# Patient Record
Sex: FEMALE | Race: WHITE | NOT HISPANIC OR LATINO
[De-identification: names, ages, dates, MRNs, and addresses within clinical notes are randomized per-mention and may not be internally consistent; named-entity substitution may affect disease eponyms.]

---

## 2017-01-25 ENCOUNTER — APPOINTMENT (OUTPATIENT)
Dept: COLORECTAL SURGERY | Facility: CLINIC | Age: 37
End: 2017-01-25

## 2017-01-25 VITALS
OXYGEN SATURATION: 98 % | BODY MASS INDEX: 21.34 KG/M2 | WEIGHT: 125 LBS | RESPIRATION RATE: 14 BRPM | HEIGHT: 64 IN | DIASTOLIC BLOOD PRESSURE: 76 MMHG | SYSTOLIC BLOOD PRESSURE: 106 MMHG | HEART RATE: 87 BPM

## 2017-01-25 RX ORDER — FLUCONAZOLE 150 MG/1
150 TABLET ORAL
Qty: 2 | Refills: 0 | Status: DISCONTINUED | COMMUNITY
Start: 2016-11-02

## 2017-01-25 RX ORDER — AMOXICILLIN AND CLAVULANATE POTASSIUM 875; 125 MG/1; MG/1
875-125 TABLET, COATED ORAL
Qty: 20 | Refills: 0 | Status: DISCONTINUED | COMMUNITY
Start: 2016-12-25

## 2017-01-25 RX ORDER — FLUTICASONE PROPIONATE 50 UG/1
50 SPRAY, METERED NASAL
Qty: 16 | Refills: 0 | Status: DISCONTINUED | COMMUNITY
Start: 2017-01-14

## 2017-04-23 ENCOUNTER — EMERGENCY (EMERGENCY)
Facility: HOSPITAL | Age: 37
LOS: 0 days | Discharge: HOME | End: 2017-04-23
Attending: EMERGENCY MEDICINE
Payer: COMMERCIAL

## 2017-04-23 VITALS
HEIGHT: 63 IN | RESPIRATION RATE: 17 BRPM | TEMPERATURE: 99 F | HEART RATE: 102 BPM | SYSTOLIC BLOOD PRESSURE: 105 MMHG | OXYGEN SATURATION: 96 % | WEIGHT: 125 LBS | DIASTOLIC BLOOD PRESSURE: 65 MMHG

## 2017-04-23 VITALS
OXYGEN SATURATION: 99 % | SYSTOLIC BLOOD PRESSURE: 106 MMHG | RESPIRATION RATE: 17 BRPM | HEART RATE: 86 BPM | DIASTOLIC BLOOD PRESSURE: 66 MMHG

## 2017-04-23 DIAGNOSIS — R05 COUGH: ICD-10-CM

## 2017-04-23 DIAGNOSIS — R19.7 DIARRHEA, UNSPECIFIED: ICD-10-CM

## 2017-04-23 DIAGNOSIS — K50.90 CROHN'S DISEASE, UNSPECIFIED, WITHOUT COMPLICATIONS: ICD-10-CM

## 2017-04-23 DIAGNOSIS — R11.2 NAUSEA WITH VOMITING, UNSPECIFIED: ICD-10-CM

## 2017-04-23 DIAGNOSIS — Z98.89 OTHER SPECIFIED POSTPROCEDURAL STATES: Chronic | ICD-10-CM

## 2017-04-23 DIAGNOSIS — G43.909 MIGRAINE, UNSPECIFIED, NOT INTRACTABLE, WITHOUT STATUS MIGRAINOSUS: ICD-10-CM

## 2017-04-23 DIAGNOSIS — E03.9 HYPOTHYROIDISM, UNSPECIFIED: ICD-10-CM

## 2017-04-23 LAB
ALBUMIN SERPL ELPH-MCNC: 3.6 G/DL — SIGNIFICANT CHANGE UP (ref 3.3–5)
ALP SERPL-CCNC: 82 U/L — SIGNIFICANT CHANGE UP (ref 40–120)
ALT FLD-CCNC: 176 U/L — HIGH (ref 12–78)
ANION GAP SERPL CALC-SCNC: 9 MMOL/L — SIGNIFICANT CHANGE UP (ref 5–17)
AST SERPL-CCNC: 76 U/L — HIGH (ref 15–37)
BASOPHILS # BLD AUTO: 0 K/UL — SIGNIFICANT CHANGE UP (ref 0–0.2)
BASOPHILS NFR BLD AUTO: 0.5 % — SIGNIFICANT CHANGE UP (ref 0–2)
BILIRUB SERPL-MCNC: 2 MG/DL — HIGH (ref 0.2–1.2)
BUN SERPL-MCNC: 12 MG/DL — SIGNIFICANT CHANGE UP (ref 7–23)
CALCIUM SERPL-MCNC: 8.2 MG/DL — LOW (ref 8.5–10.1)
CHLORIDE SERPL-SCNC: 103 MMOL/L — SIGNIFICANT CHANGE UP (ref 96–108)
CO2 SERPL-SCNC: 26 MMOL/L — SIGNIFICANT CHANGE UP (ref 22–31)
CREAT SERPL-MCNC: 0.73 MG/DL — SIGNIFICANT CHANGE UP (ref 0.5–1.3)
EOSINOPHIL # BLD AUTO: 0 K/UL — SIGNIFICANT CHANGE UP (ref 0–0.5)
EOSINOPHIL NFR BLD AUTO: 0 % — SIGNIFICANT CHANGE UP (ref 0–6)
GLUCOSE SERPL-MCNC: 85 MG/DL — SIGNIFICANT CHANGE UP (ref 70–99)
HCG SERPL-ACNC: <1 MIU/ML — SIGNIFICANT CHANGE UP
HCT VFR BLD CALC: 38.3 % — SIGNIFICANT CHANGE UP (ref 34.5–45)
HGB BLD-MCNC: 13.4 G/DL — SIGNIFICANT CHANGE UP (ref 11.5–15.5)
LACTATE SERPL-SCNC: 0.8 MMOL/L — SIGNIFICANT CHANGE UP (ref 0.7–2)
LIDOCAIN IGE QN: 177 U/L — SIGNIFICANT CHANGE UP (ref 73–393)
LYMPHOCYTES # BLD AUTO: 1.2 K/UL — SIGNIFICANT CHANGE UP (ref 1–3.3)
LYMPHOCYTES # BLD AUTO: 16.6 % — SIGNIFICANT CHANGE UP (ref 13–44)
MCHC RBC-ENTMCNC: 30.7 PG — SIGNIFICANT CHANGE UP (ref 27–34)
MCHC RBC-ENTMCNC: 34.8 GM/DL — SIGNIFICANT CHANGE UP (ref 32–36)
MCV RBC AUTO: 88.1 FL — SIGNIFICANT CHANGE UP (ref 80–100)
MONOCYTES # BLD AUTO: 0.5 K/UL — SIGNIFICANT CHANGE UP (ref 0–0.9)
MONOCYTES NFR BLD AUTO: 7.3 % — SIGNIFICANT CHANGE UP (ref 2–14)
NEUTROPHILS # BLD AUTO: 5.5 K/UL — SIGNIFICANT CHANGE UP (ref 1.8–7.4)
NEUTROPHILS NFR BLD AUTO: 75.5 % — SIGNIFICANT CHANGE UP (ref 43–77)
PLATELET # BLD AUTO: 181 K/UL — SIGNIFICANT CHANGE UP (ref 150–400)
POTASSIUM SERPL-MCNC: 3.6 MMOL/L — SIGNIFICANT CHANGE UP (ref 3.5–5.3)
POTASSIUM SERPL-SCNC: 3.6 MMOL/L — SIGNIFICANT CHANGE UP (ref 3.5–5.3)
PROT SERPL-MCNC: 7.3 GM/DL — SIGNIFICANT CHANGE UP (ref 6–8.3)
RBC # BLD: 4.35 M/UL — SIGNIFICANT CHANGE UP (ref 3.8–5.2)
RBC # FLD: 13.4 % — SIGNIFICANT CHANGE UP (ref 11–15)
SODIUM SERPL-SCNC: 138 MMOL/L — SIGNIFICANT CHANGE UP (ref 135–145)
WBC # BLD: 7.3 K/UL — SIGNIFICANT CHANGE UP (ref 3.8–10.5)
WBC # FLD AUTO: 7.3 K/UL — SIGNIFICANT CHANGE UP (ref 3.8–10.5)

## 2017-04-23 PROCEDURE — 99285 EMERGENCY DEPT VISIT HI MDM: CPT

## 2017-04-23 PROCEDURE — 71010: CPT | Mod: 26

## 2017-04-23 PROCEDURE — 74177 CT ABD & PELVIS W/CONTRAST: CPT | Mod: 26

## 2017-04-23 RX ORDER — KETOROLAC TROMETHAMINE 30 MG/ML
30 SYRINGE (ML) INJECTION ONCE
Qty: 0 | Refills: 0 | Status: DISCONTINUED | OUTPATIENT
Start: 2017-04-23 | End: 2017-04-23

## 2017-04-23 RX ORDER — SODIUM CHLORIDE 9 MG/ML
1000 INJECTION INTRAMUSCULAR; INTRAVENOUS; SUBCUTANEOUS ONCE
Qty: 0 | Refills: 0 | Status: COMPLETED | OUTPATIENT
Start: 2017-04-23 | End: 2017-04-23

## 2017-04-23 RX ORDER — ONDANSETRON 8 MG/1
8 TABLET, FILM COATED ORAL ONCE
Qty: 0 | Refills: 0 | Status: COMPLETED | OUTPATIENT
Start: 2017-04-23 | End: 2017-04-23

## 2017-04-23 RX ORDER — ONDANSETRON 8 MG/1
1 TABLET, FILM COATED ORAL
Qty: 6 | Refills: 0
Start: 2017-04-23 | End: 2017-04-26

## 2017-04-23 RX ORDER — TRAMADOL HYDROCHLORIDE 50 MG/1
1 TABLET ORAL
Qty: 12 | Refills: 0
Start: 2017-04-23 | End: 2017-04-26

## 2017-04-23 RX ADMIN — SODIUM CHLORIDE 1000 MILLILITER(S): 9 INJECTION INTRAMUSCULAR; INTRAVENOUS; SUBCUTANEOUS at 12:33

## 2017-04-23 RX ADMIN — Medication 30 MILLIGRAM(S): at 14:45

## 2017-04-23 RX ADMIN — ONDANSETRON 8 MILLIGRAM(S): 8 TABLET, FILM COATED ORAL at 12:44

## 2017-04-23 NOTE — ED PROVIDER NOTE - MEDICAL DECISION MAKING DETAILS
pt with nause/vomiting otherwise no ileitis on CT and noted no abd pain, cough without xr evidence of PNA, to dc with hycodan for cough and to dc with follow up with GI.

## 2017-04-23 NOTE — ED PROVIDER NOTE - OBJECTIVE STATEMENT
36 year old female with PMH of Crohns's disease, hypothyroid, Migraines hx presenting due to diarrhea noted x 1 day 2 days ago then some nausea/vomiting with cough noted as well. Fever at home Denies any SOB but states there is pain and discomfort on cough, no abd pain.

## 2017-04-23 NOTE — ED ADULT TRIAGE NOTE - CHIEF COMPLAINT QUOTE
" In just don't feel good, Friday I have diarrhea and yesterday I was throwing up and I have a really bad cough"

## 2017-04-23 NOTE — ED ADULT NURSE NOTE - OBJECTIVE STATEMENT
C/O diarrhea on Friday with vomiting yesterday, denies abdominal pain. Pt c/o coughing up brown sputum since Tuesday with fever at home yesterday

## 2018-02-13 ENCOUNTER — APPOINTMENT (OUTPATIENT)
Dept: OTOLARYNGOLOGY | Facility: CLINIC | Age: 38
End: 2018-02-13
Payer: COMMERCIAL

## 2018-02-13 VITALS
HEART RATE: 71 BPM | HEIGHT: 64 IN | WEIGHT: 124 LBS | BODY MASS INDEX: 21.17 KG/M2 | SYSTOLIC BLOOD PRESSURE: 97 MMHG | DIASTOLIC BLOOD PRESSURE: 55 MMHG

## 2018-02-13 DIAGNOSIS — H69.83 OTHER SPECIFIED DISORDERS OF EUSTACHIAN TUBE, BILATERAL: ICD-10-CM

## 2018-02-13 DIAGNOSIS — R09.81 NASAL CONGESTION: ICD-10-CM

## 2018-02-13 DIAGNOSIS — R09.89 OTHER SPECIFIED SYMPTOMS AND SIGNS INVOLVING THE CIRCULATORY AND RESPIRATORY SYSTEMS: ICD-10-CM

## 2018-02-13 DIAGNOSIS — J34.2 DEVIATED NASAL SEPTUM: ICD-10-CM

## 2018-02-13 DIAGNOSIS — J03.01 ACUTE RECURRENT STREPTOCOCCAL TONSILLITIS: ICD-10-CM

## 2018-02-13 PROCEDURE — 99204 OFFICE O/P NEW MOD 45 MIN: CPT | Mod: 25

## 2018-02-13 PROCEDURE — 31231 NASAL ENDOSCOPY DX: CPT

## 2019-07-10 ENCOUNTER — TRANSCRIPTION ENCOUNTER (OUTPATIENT)
Age: 39
End: 2019-07-10

## 2019-08-13 ENCOUNTER — APPOINTMENT (OUTPATIENT)
Dept: PULMONOLOGY | Facility: CLINIC | Age: 39
End: 2019-08-13
Payer: COMMERCIAL

## 2019-08-13 ENCOUNTER — APPOINTMENT (OUTPATIENT)
Dept: PULMONOLOGY | Facility: CLINIC | Age: 39
End: 2019-08-13

## 2019-08-13 VITALS
HEART RATE: 77 BPM | DIASTOLIC BLOOD PRESSURE: 73 MMHG | SYSTOLIC BLOOD PRESSURE: 108 MMHG | RESPIRATION RATE: 16 BRPM | OXYGEN SATURATION: 98 %

## 2019-08-13 DIAGNOSIS — F41.8 OTHER SPECIFIED ANXIETY DISORDERS: ICD-10-CM

## 2019-08-13 DIAGNOSIS — G47.00 INSOMNIA, UNSPECIFIED: ICD-10-CM

## 2019-08-13 PROCEDURE — 88738 HGB QUANT TRANSCUTANEOUS: CPT

## 2019-08-13 PROCEDURE — 99407 BEHAV CHNG SMOKING > 10 MIN: CPT | Mod: 25

## 2019-08-13 PROCEDURE — 94250: CPT

## 2019-08-13 PROCEDURE — 94060 EVALUATION OF WHEEZING: CPT

## 2019-08-13 PROCEDURE — 95012 NITRIC OXIDE EXP GAS DETER: CPT

## 2019-08-13 PROCEDURE — 94727 GAS DIL/WSHOT DETER LNG VOL: CPT

## 2019-08-13 PROCEDURE — 71046 X-RAY EXAM CHEST 2 VIEWS: CPT

## 2019-08-13 PROCEDURE — 94729 DIFFUSING CAPACITY: CPT

## 2019-08-13 PROCEDURE — 99205 OFFICE O/P NEW HI 60 MIN: CPT | Mod: 25

## 2019-08-13 NOTE — PHYSICAL EXAM
[Normal Appearance] : normal appearance [General Appearance - Well Developed] : well developed [General Appearance - Well Nourished] : well nourished [No Deformities] : no deformities [Well Groomed] : well groomed [General Appearance - In No Acute Distress] : no acute distress [Normal Oropharynx] : normal oropharynx [Heart Rate And Rhythm] : heart rate and rhythm were normal [Heart Sounds] : normal S1 and S2 [Murmurs] : no murmurs present [Exaggerated Use Of Accessory Muscles For Inspiration] : no accessory muscle use [Respiration, Rhythm And Depth] : normal respiratory rhythm and effort [Abdomen Soft] : soft [Auscultation Breath Sounds / Voice Sounds] : lungs were clear to auscultation bilaterally [Abdomen Tenderness] : non-tender [Abdomen Mass (___ Cm)] : no abdominal mass palpated [Nail Clubbing] : no clubbing of the fingernails [Petechial Hemorrhages (___cm)] : no petechial hemorrhages [] : no ischemic changes [Cyanosis, Localized] : no localized cyanosis [Sensation] : the sensory exam was normal to light touch and pinprick [Deep Tendon Reflexes (DTR)] : deep tendon reflexes were 2+ and symmetric [No Focal Deficits] : no focal deficits

## 2019-08-13 NOTE — ASSESSMENT
[FreeTextEntry1] : 10 minutes spent in smoking cessation counseling. Educated patient on deleterious effects and all potential consequences of smoking. Counseled patient on various smoking cessation techniques.\braden Ordered a home sleep study to definitively rule out obstructive sleep apnea, I advised her to return to the office for a followup visit after home sleep study has been performed.\braden Starting her on Celexa 10 mg p.o. q.d. for anxiety/depression, should home sleep study show no evidence of obstructive sleep apnea, would then recommend Ativan 1 mg p.o. t.i.d. p.r.n.

## 2019-08-13 NOTE — PROCEDURE
[FreeTextEntry1] : Chest x-ray PA and lateral views performed in my office today showed clear lungs, no evidence of infiltrates or pleural effusions.\par \par Pulmonary Function Test: Lung Volume: Within normal limits; Spirometry: Within normal limits with no improvement post bronchodilator; Diffusion: Within normal limits.\par \par Exhaled nitric oxide level is 7  PPB\par \par Carbon monoxide level is 1 PPM\par \par

## 2019-08-13 NOTE — REVIEW OF SYSTEMS
[Difficulty Initiating Sleep] : difficulty falling asleep [Difficulty Maintaining Sleep] : difficulty maintaining sleep [Negative] : Pulmonary Hypertension

## 2019-08-13 NOTE — REASON FOR VISIT
PAP order was faxed to [facility] on 1/2/18 [Initial Evaluation] : an initial evaluation [Hypersomnolence] : hypersomnolence  [FreeTextEntry2] : insomnia

## 2019-08-14 LAB — POCT - HEMOGLOBIN (HGB), QUANTITATIVE, TRANSCUTANEOUS: 12.3

## 2019-08-30 ENCOUNTER — APPOINTMENT (OUTPATIENT)
Dept: PULMONOLOGY | Facility: CLINIC | Age: 39
End: 2019-08-30
Payer: COMMERCIAL

## 2019-08-30 PROCEDURE — ZZZZZ: CPT

## 2019-09-10 ENCOUNTER — APPOINTMENT (OUTPATIENT)
Dept: PULMONOLOGY | Facility: CLINIC | Age: 39
End: 2019-09-10

## 2019-10-22 ENCOUNTER — APPOINTMENT (OUTPATIENT)
Dept: ENDOCRINOLOGY | Facility: CLINIC | Age: 39
End: 2019-10-22
Payer: COMMERCIAL

## 2019-10-22 VITALS
SYSTOLIC BLOOD PRESSURE: 90 MMHG | HEIGHT: 64 IN | HEART RATE: 90 BPM | OXYGEN SATURATION: 99 % | DIASTOLIC BLOOD PRESSURE: 60 MMHG | WEIGHT: 120 LBS | BODY MASS INDEX: 20.49 KG/M2

## 2019-10-22 PROCEDURE — 36415 COLL VENOUS BLD VENIPUNCTURE: CPT

## 2019-10-22 PROCEDURE — 99213 OFFICE O/P EST LOW 20 MIN: CPT | Mod: 25

## 2019-10-22 RX ORDER — CLINDAMYCIN HYDROCHLORIDE 300 MG/1
CAPSULE ORAL
Refills: 0 | Status: DISCONTINUED | COMMUNITY
End: 2019-10-22

## 2019-10-22 RX ORDER — OMEPRAZOLE 40 MG/1
40 CAPSULE, DELAYED RELEASE ORAL
Qty: 30 | Refills: 0 | Status: DISCONTINUED | COMMUNITY
Start: 2017-01-16 | End: 2019-10-22

## 2019-10-23 NOTE — ASSESSMENT
[FreeTextEntry1] : 40 yo F with PMH Hashimoto's thyroiditis, Crohn's disease, thyroid nodule\par \par 1. Hashimoto's thyroiditis - will check TFTs. \par \par 2. Thyroid nodule - refer for thyroid US

## 2019-10-23 NOTE — HISTORY OF PRESENT ILLNESS
[FreeTextEntry1] : 38 yo F with PMH Hashimoto's thyroiditis, crohn's disease, thyroid nodule\par \par Prior Endocrinologist: Dr Charles\par diagnosed with hypothyroidism 2013 in the setting of hair loss\par she is on synthroid brand 100 mcg qd. \par She denies FHx of thyroid disease. \par She denies history of radiation to the head or neck. \par She denies history of amiodarone or lithium use.\par No recent steroids \par reports history of thyroid nodule - reportedly benign FNA\par TSH 1.86 10/2/19\par \par

## 2019-10-29 LAB
25(OH)D3 SERPL-MCNC: 50.9 NG/ML
ALBUMIN SERPL ELPH-MCNC: 4.9 G/DL
ALP BLD-CCNC: 50 U/L
ALT SERPL-CCNC: 9 U/L
ANION GAP SERPL CALC-SCNC: 16 MMOL/L
AST SERPL-CCNC: 19 U/L
BASOPHILS # BLD AUTO: 0.03 K/UL
BASOPHILS NFR BLD AUTO: 0.5 %
BILIRUB SERPL-MCNC: 1 MG/DL
BUN SERPL-MCNC: 13 MG/DL
CALCIUM SERPL-MCNC: 9.9 MG/DL
CHLORIDE SERPL-SCNC: 101 MMOL/L
CO2 SERPL-SCNC: 24 MMOL/L
CREAT SERPL-MCNC: 0.73 MG/DL
EOSINOPHIL # BLD AUTO: 0.05 K/UL
EOSINOPHIL NFR BLD AUTO: 0.9 %
ESTIMATED AVERAGE GLUCOSE: 97 MG/DL
GLUCOSE SERPL-MCNC: 111 MG/DL
HBA1C MFR BLD HPLC: 5 %
HCT VFR BLD CALC: 39.4 %
HGB BLD-MCNC: 12.6 G/DL
IMM GRANULOCYTES NFR BLD AUTO: 0.3 %
LYMPHOCYTES # BLD AUTO: 1.85 K/UL
LYMPHOCYTES NFR BLD AUTO: 31.5 %
MAN DIFF?: NORMAL
MCHC RBC-ENTMCNC: 31.7 PG
MCHC RBC-ENTMCNC: 32 GM/DL
MCV RBC AUTO: 99 FL
MONOCYTES # BLD AUTO: 0.44 K/UL
MONOCYTES NFR BLD AUTO: 7.5 %
NEUTROPHILS # BLD AUTO: 3.48 K/UL
NEUTROPHILS NFR BLD AUTO: 59.3 %
PLATELET # BLD AUTO: 309 K/UL
POTASSIUM SERPL-SCNC: 3.7 MMOL/L
PROT SERPL-MCNC: 7.3 G/DL
RBC # BLD: 3.98 M/UL
RBC # FLD: 13.2 %
SODIUM SERPL-SCNC: 141 MMOL/L
T4 FREE SERPL-MCNC: 1.8 NG/DL
TSH SERPL-ACNC: 1.08 UIU/ML
WBC # FLD AUTO: 5.87 K/UL

## 2019-11-12 ENCOUNTER — RX RENEWAL (OUTPATIENT)
Age: 39
End: 2019-11-12

## 2019-11-27 ENCOUNTER — RX RENEWAL (OUTPATIENT)
Age: 39
End: 2019-11-27

## 2019-12-09 ENCOUNTER — INPATIENT (INPATIENT)
Facility: HOSPITAL | Age: 39
LOS: 2 days | Discharge: HOME | DRG: 386 | End: 2019-12-12
Attending: HOSPITALIST | Admitting: STUDENT IN AN ORGANIZED HEALTH CARE EDUCATION/TRAINING PROGRAM
Payer: COMMERCIAL

## 2019-12-09 ENCOUNTER — APPOINTMENT (OUTPATIENT)
Dept: OBGYN | Facility: CLINIC | Age: 39
End: 2019-12-09
Payer: COMMERCIAL

## 2019-12-09 VITALS
HEIGHT: 64 IN | HEART RATE: 86 BPM | SYSTOLIC BLOOD PRESSURE: 128 MMHG | DIASTOLIC BLOOD PRESSURE: 80 MMHG | OXYGEN SATURATION: 100 % | WEIGHT: 119.93 LBS | RESPIRATION RATE: 18 BRPM | TEMPERATURE: 98 F

## 2019-12-09 VITALS
WEIGHT: 120 LBS | BODY MASS INDEX: 20.49 KG/M2 | DIASTOLIC BLOOD PRESSURE: 60 MMHG | SYSTOLIC BLOOD PRESSURE: 99 MMHG | HEIGHT: 64 IN

## 2019-12-09 DIAGNOSIS — Z98.89 OTHER SPECIFIED POSTPROCEDURAL STATES: Chronic | ICD-10-CM

## 2019-12-09 PROCEDURE — 99203 OFFICE O/P NEW LOW 30 MIN: CPT

## 2019-12-09 PROCEDURE — 99284 EMERGENCY DEPT VISIT MOD MDM: CPT

## 2019-12-10 ENCOUNTER — APPOINTMENT (OUTPATIENT)
Dept: OTOLARYNGOLOGY | Facility: CLINIC | Age: 39
End: 2019-12-10

## 2019-12-10 DIAGNOSIS — Z02.9 ENCOUNTER FOR ADMINISTRATIVE EXAMINATIONS, UNSPECIFIED: ICD-10-CM

## 2019-12-10 DIAGNOSIS — E03.8 OTHER SPECIFIED HYPOTHYROIDISM: ICD-10-CM

## 2019-12-10 DIAGNOSIS — K50.812 CROHN'S DISEASE OF BOTH SMALL AND LARGE INTESTINE WITH INTESTINAL OBSTRUCTION: ICD-10-CM

## 2019-12-10 DIAGNOSIS — K56.609 UNSPECIFIED INTESTINAL OBSTRUCTION, UNSPECIFIED AS TO PARTIAL VERSUS COMPLETE OBSTRUCTION: ICD-10-CM

## 2019-12-10 DIAGNOSIS — Z79.899 OTHER LONG TERM (CURRENT) DRUG THERAPY: ICD-10-CM

## 2019-12-10 DIAGNOSIS — F17.200 NICOTINE DEPENDENCE, UNSPECIFIED, UNCOMPLICATED: ICD-10-CM

## 2019-12-10 DIAGNOSIS — Z29.9 ENCOUNTER FOR PROPHYLACTIC MEASURES, UNSPECIFIED: ICD-10-CM

## 2019-12-10 LAB
ALBUMIN SERPL ELPH-MCNC: 4.3 G/DL — SIGNIFICANT CHANGE UP (ref 3.3–5)
ALP SERPL-CCNC: 60 U/L — SIGNIFICANT CHANGE UP (ref 40–120)
ALT FLD-CCNC: 17 U/L — SIGNIFICANT CHANGE UP (ref 10–45)
ANION GAP SERPL CALC-SCNC: 15 MMOL/L — SIGNIFICANT CHANGE UP (ref 5–17)
AST SERPL-CCNC: 19 U/L — SIGNIFICANT CHANGE UP (ref 10–40)
BASOPHILS # BLD AUTO: 0.02 K/UL — SIGNIFICANT CHANGE UP (ref 0–0.2)
BASOPHILS NFR BLD AUTO: 0.2 % — SIGNIFICANT CHANGE UP (ref 0–2)
BILIRUB SERPL-MCNC: 0.6 MG/DL — SIGNIFICANT CHANGE UP (ref 0.2–1.2)
BUN SERPL-MCNC: 11 MG/DL — SIGNIFICANT CHANGE UP (ref 7–23)
CALCIUM SERPL-MCNC: 8.8 MG/DL — SIGNIFICANT CHANGE UP (ref 8.4–10.5)
CHLORIDE SERPL-SCNC: 101 MMOL/L — SIGNIFICANT CHANGE UP (ref 96–108)
CO2 SERPL-SCNC: 23 MMOL/L — SIGNIFICANT CHANGE UP (ref 22–31)
CREAT SERPL-MCNC: 0.76 MG/DL — SIGNIFICANT CHANGE UP (ref 0.5–1.3)
EOSINOPHIL # BLD AUTO: 0.05 K/UL — SIGNIFICANT CHANGE UP (ref 0–0.5)
EOSINOPHIL NFR BLD AUTO: 0.4 % — SIGNIFICANT CHANGE UP (ref 0–6)
GLUCOSE BLDC GLUCOMTR-MCNC: 133 MG/DL — HIGH (ref 70–99)
GLUCOSE SERPL-MCNC: 110 MG/DL — HIGH (ref 70–99)
HCG SERPL-ACNC: <2 MIU/ML — SIGNIFICANT CHANGE UP
HCT VFR BLD CALC: 37.9 % — SIGNIFICANT CHANGE UP (ref 34.5–45)
HGB BLD-MCNC: 13 G/DL — SIGNIFICANT CHANGE UP (ref 11.5–15.5)
IMM GRANULOCYTES NFR BLD AUTO: 0.4 % — SIGNIFICANT CHANGE UP (ref 0–1.5)
LIDOCAIN IGE QN: 51 U/L — SIGNIFICANT CHANGE UP (ref 7–60)
LYMPHOCYTES # BLD AUTO: 1.62 K/UL — SIGNIFICANT CHANGE UP (ref 1–3.3)
LYMPHOCYTES # BLD AUTO: 12.9 % — LOW (ref 13–44)
MCHC RBC-ENTMCNC: 32 PG — SIGNIFICANT CHANGE UP (ref 27–34)
MCHC RBC-ENTMCNC: 34.3 GM/DL — SIGNIFICANT CHANGE UP (ref 32–36)
MCV RBC AUTO: 93.3 FL — SIGNIFICANT CHANGE UP (ref 80–100)
MONOCYTES # BLD AUTO: 0.77 K/UL — SIGNIFICANT CHANGE UP (ref 0–0.9)
MONOCYTES NFR BLD AUTO: 6.1 % — SIGNIFICANT CHANGE UP (ref 2–14)
NEUTROPHILS # BLD AUTO: 10.06 K/UL — HIGH (ref 1.8–7.4)
NEUTROPHILS NFR BLD AUTO: 80 % — HIGH (ref 43–77)
NRBC # BLD: 0 /100 WBCS — SIGNIFICANT CHANGE UP (ref 0–0)
PLATELET # BLD AUTO: 328 K/UL — SIGNIFICANT CHANGE UP (ref 150–400)
POTASSIUM SERPL-MCNC: 3.8 MMOL/L — SIGNIFICANT CHANGE UP (ref 3.5–5.3)
POTASSIUM SERPL-SCNC: 3.8 MMOL/L — SIGNIFICANT CHANGE UP (ref 3.5–5.3)
PROT SERPL-MCNC: 7.6 G/DL — SIGNIFICANT CHANGE UP (ref 6–8.3)
RAPID RVP RESULT: SIGNIFICANT CHANGE UP
RBC # BLD: 4.06 M/UL — SIGNIFICANT CHANGE UP (ref 3.8–5.2)
RBC # FLD: 13.3 % — SIGNIFICANT CHANGE UP (ref 10.3–14.5)
SODIUM SERPL-SCNC: 139 MMOL/L — SIGNIFICANT CHANGE UP (ref 135–145)
WBC # BLD: 12.57 K/UL — HIGH (ref 3.8–10.5)
WBC # FLD AUTO: 12.57 K/UL — HIGH (ref 3.8–10.5)

## 2019-12-10 PROCEDURE — 74177 CT ABD & PELVIS W/CONTRAST: CPT | Mod: 26

## 2019-12-10 PROCEDURE — 99223 1ST HOSP IP/OBS HIGH 75: CPT | Mod: AI

## 2019-12-10 PROCEDURE — 12345: CPT | Mod: NC

## 2019-12-10 PROCEDURE — 99222 1ST HOSP IP/OBS MODERATE 55: CPT

## 2019-12-10 PROCEDURE — 71045 X-RAY EXAM CHEST 1 VIEW: CPT | Mod: 26

## 2019-12-10 RX ORDER — LEVOTHYROXINE SODIUM 125 MCG
75 TABLET ORAL AT BEDTIME
Refills: 0 | Status: DISCONTINUED | OUTPATIENT
Start: 2019-12-10 | End: 2019-12-12

## 2019-12-10 RX ORDER — ENOXAPARIN SODIUM 100 MG/ML
40 INJECTION SUBCUTANEOUS DAILY
Refills: 0 | Status: DISCONTINUED | OUTPATIENT
Start: 2019-12-10 | End: 2019-12-12

## 2019-12-10 RX ORDER — MORPHINE SULFATE 50 MG/1
2 CAPSULE, EXTENDED RELEASE ORAL ONCE
Refills: 0 | Status: DISCONTINUED | OUTPATIENT
Start: 2019-12-10 | End: 2019-12-10

## 2019-12-10 RX ORDER — SODIUM CHLORIDE 9 MG/ML
1000 INJECTION, SOLUTION INTRAVENOUS ONCE
Refills: 0 | Status: COMPLETED | OUTPATIENT
Start: 2019-12-10 | End: 2019-12-10

## 2019-12-10 RX ORDER — LEVOTHYROXINE SODIUM 125 MCG
1 TABLET ORAL
Qty: 0 | Refills: 0 | DISCHARGE

## 2019-12-10 RX ORDER — MORPHINE SULFATE 50 MG/1
2 CAPSULE, EXTENDED RELEASE ORAL ONCE
Refills: 0 | Status: DISCONTINUED | OUTPATIENT
Start: 2019-12-10 | End: 2019-12-12

## 2019-12-10 RX ORDER — MERCAPTOPURINE 50 MG/1
0 TABLET ORAL
Qty: 0 | Refills: 0 | DISCHARGE

## 2019-12-10 RX ORDER — SODIUM CHLORIDE 9 MG/ML
1000 INJECTION, SOLUTION INTRAVENOUS
Refills: 0 | Status: DISCONTINUED | OUTPATIENT
Start: 2019-12-10 | End: 2019-12-12

## 2019-12-10 RX ORDER — MERCAPTOPURINE 50 MG/1
1 TABLET ORAL
Qty: 0 | Refills: 0 | DISCHARGE

## 2019-12-10 RX ORDER — BENZOCAINE AND MENTHOL 5; 1 G/100ML; G/100ML
1 LIQUID ORAL EVERY 6 HOURS
Refills: 0 | Status: DISCONTINUED | OUTPATIENT
Start: 2019-12-10 | End: 2019-12-12

## 2019-12-10 RX ADMIN — Medication 75 MICROGRAM(S): at 23:09

## 2019-12-10 RX ADMIN — SODIUM CHLORIDE 1000 MILLILITER(S): 9 INJECTION, SOLUTION INTRAVENOUS at 01:17

## 2019-12-10 RX ADMIN — Medication 20 MILLIGRAM(S): at 23:09

## 2019-12-10 RX ADMIN — MORPHINE SULFATE 2 MILLIGRAM(S): 50 CAPSULE, EXTENDED RELEASE ORAL at 05:44

## 2019-12-10 RX ADMIN — Medication 125 MILLIGRAM(S): at 05:44

## 2019-12-10 RX ADMIN — BENZOCAINE AND MENTHOL 1 LOZENGE: 5; 1 LIQUID ORAL at 14:18

## 2019-12-10 RX ADMIN — SODIUM CHLORIDE 100 MILLILITER(S): 9 INJECTION, SOLUTION INTRAVENOUS at 13:29

## 2019-12-10 RX ADMIN — Medication 20 MILLIGRAM(S): at 13:30

## 2019-12-10 NOTE — H&P ADULT - ADDITIONAL PE
T(F): 98.6 (10 Dec 2019 06:10), Max: 98.6 (10 Dec 2019 06:10)  HR: 72 - 86  BP: 98/68 - 128/80  RR: 16 - 18  SpO2: 99% - 100%

## 2019-12-10 NOTE — ED ADULT NURSE REASSESSMENT NOTE - NS ED NURSE REASSESS COMMENT FT1
Pt remains stable in the ED. Resting comfortably in bed with family at the bedside. A&Ox3. Breathing spontaneously and unlabored. NAD. Surgery came to see the pt. Stated that they don't want to do surgery right now. Will continue to monitor. Awaiting RVP Panel

## 2019-12-10 NOTE — H&P ADULT - PROBLEM SELECTOR PLAN 2
failed Remicade, currently on Humira being admitted with SBO due to Crohn's  - s/p Solumedrol 125 x1 in ED this am  - GI consulted for further management options

## 2019-12-10 NOTE — ED PROVIDER NOTE - ATTENDING CONTRIBUTION TO CARE
pt w hx crohns here w lower abd pain, n/v, poor appetite. on exam, pt with diffuse right sided lower>upper abd tenderness. CT shows terminal ileitis with stricture causing SBO. pt got IVFs, pain control, surgery consult - awaiting dispo by surgery team, expect admission for further care of SBO and ileitis.

## 2019-12-10 NOTE — H&P ADULT - PROBLEM SELECTOR PLAN 6
Transitions of Care Status:  1.  Name of PCP: Lani Ware (JULIO), but planning to change due to insurance  2.  PCP Contacted on Admission: [x ] Y    [ ] N    3.  PCP contacted at Discharge: [ ] Y    [ ] N    [ ] N/A  4.  Post-Discharge Appointment Date and Location:  5.  Summary of Handoff given to PCP:

## 2019-12-10 NOTE — ED ADULT NURSE REASSESSMENT NOTE - NS ED NURSE REASSESS COMMENT FT1
PT CT results show SBO, NG tube placed by surgery 200 mL dark gastric contents suctioned into cannister. Pt tolerated NG placement well. NAD.

## 2019-12-10 NOTE — CONSULT NOTE ADULT - SUBJECTIVE AND OBJECTIVE BOX
Chief Complaint:  Patient is a 39y old  Female who presents with a chief complaint of abd pain (10 Dec 2019 07:38)      HPI: Pt is a 40yo F PMH Crohn's diagnosed in , now Humira [id number], 6MP with previous bowel obstructions upon diagnosis, hypothyroidism who presents with 3 days of lwoer abdominal pain. Pt noted symptoms happened in 10/27 and then self resolved. Patient reports she started having recurrent pain in her lower abdomen on Saturday night, associated with one episode of emesis. She thought it gynecologic pain. Pelvic sono a month ago was reported to be normal. The pain continued to travel upwards, intermittent 8-10/10 sharp, associated with abd bloating, decrease PO intake, not significantly improved with Ibuprofen. Pt had no BM for the last 2 days and not eating solids for 3 days and not passing gas- but states she never passes gas as she is on low reisdue diet. Recently completed 10 days course of Clinda for Strep throat and Otitis (Rx by ENT).  Denies recent travel or sick contact.    Per pt, dx in , was on pentasa and then switched to a [facility] provider. She was placed on remicade for 1 year and did well. Then switched to humira as she didnt have time for infusions. She was doing well for 2 years on humira but not with symptoms on this admission.    Allergies:  No Known Allergies      Home Medications:    Hospital Medications:  enoxaparin Injectable 40 milliGRAM(s) SubCutaneous daily  lactated ringers. 1000 milliLiter(s) IV Continuous <Continuous>  levothyroxine Injectable 75 MICROGram(s) IV Push at bedtime  morphine  - Injectable 2 milliGRAM(s) IV Push once PRN      PMHX/PSHX:  Migraines  Crohn disease  Hypothyroid  No pertinent past medical history  H/O  section  Pregnancy with history of caesarean section, antepartum  No significant past surgical history      Family history:  Family history of heart disease  Family history of stomach cancer  No pertinent family history in first degree relatives      Social History:     ROS:     General:  No wt loss, fevers, chills, night sweats, fatigue,   Eyes:  Good vision, no reported pain  ENT:  No sore throat, pain, runny nose, dysphagia  CV:  No pain, palpitations, hypo/hypertension  Resp:  No dyspnea, cough, tachypnea, wheezing  GI:  See HPI  :  No pain, bleeding, incontinence, nocturia  Muscle:  No pain, weakness  Neuro:  No weakness, tingling, memory problems  Psych:  No fatigue, insomnia, mood problems, depression  Endocrine:  No polyuria, polydipsia, cold/heat intolerance  Heme:  No petechiae, ecchymosis, easy bruisability  Skin:  No rash, edema      PHYSICAL EXAM:     Vital Signs:  Vital Signs Last 24 Hrs  T(C): 36.7 (10 Dec 2019 07:15), Max: 37 (10 Dec 2019 06:10)  T(F): 98.1 (10 Dec 2019 07:15), Max: 98.6 (10 Dec 2019 06:10)  HR: 69 (10 Dec 2019 07:15) (69 - 86)  BP: 127/83 (10 Dec 2019 07:15) (98/68 - 128/80)  BP(mean): --  RR: 18 (10 Dec 2019 07:15) (16 - 18)  SpO2: 100% (10 Dec 2019 07:15) (99% - 100%)  Daily Height in cm: 162.56 (09 Dec 2019 22:30)    Daily     GENERAL:  Appears stated age, well-groomed, well-nourished, no distress  HEENT:  NC/AT,  conjunctivae clear and pink  CHEST:  Full & symmetric excursion, no increased effort, breath sounds clear  HEART:  Regular rhythm, S1, S2, no murmur/rub/S3/S4, no abdominal bruit, no edema, no JVD  ABDOMEN:  Soft, non-tender, non-distended, normoactive bowel sounds,  no masses , no hepatosplenomegaly  EXTREMITIES:  no cyanosis, clubbing or edema  SKIN:  No rash/erythema/ecchymoses/petechiae/wounds/abscess/warm/dry  NEURO:  Alert, oriented    LABS:                        13.0   12.57 )-----------( 328      ( 10 Dec 2019 01:28 )             37.9     12-10    139  |  101  |  11  ----------------------------<  110<H>  3.8   |  23  |  0.76    Ca    8.8      10 Dec 2019 01:28    TPro  7.6  /  Alb  4.3  /  TBili  0.6  /  DBili  x   /  AST  19  /  ALT  17  /  AlkPhos  60  12-10    LIVER FUNCTIONS - ( 10 Dec 2019 01:28 )  Alb: 4.3 g/dL / Pro: 7.6 g/dL / ALK PHOS: 60 U/L / ALT: 17 U/L / AST: 19 U/L / GGT: x               Amylase Serum--      Lipase serum51       Ammonia--      Imaging:  < from: CT Abdomen and Pelvis w/Cont (06.03.15 @ 13:13) >  Findings: Visualized lung bases are unremarkable.    Liver and spleen are normal in size without focal lesion. There is no   biliary ductal dilatation. The common bile duct is normal in caliber. The   gallbladder is identified. The pancreas is without enlargement or mass.    The adrenal glands are normal. The kidneys enhance bilaterally. There is   no hydronephrosis. There is mild fullness of the upper pole calyces of   the right kidney.    There is no retroperitoneal lymphadenopathy. The visualized vascular   structures are unremarkable.    There is small bowel obstruction secondary to thickening of the distal   terminal ileum and ileocecal valve region. Narrowing is noted with   surrounding soft tissue density. A few nonspecific lymph nodes are seen   within the right lower quadrant. The appendix is not visualized.    The urinary bladder appears normal. The uterus and adnexal regions are   unremarkable.     Examination of the visualized bony structures reveals no abnormality.    IMPRESSION: Small bowel obstruction secondary to concentric thickening of   the distal terminal ileum and ileocecal valve. This could represent   Crohn's disease in this young patient. Alternatively, neoplasm cannot be   excluded. Definitive further evaluationshould be performed with   colonoscopy or CT colonoscopy.    Findings were discussed with Dr. Kellogg on 6/3/2015 at 2:05 PM by Dr. Alivia Terrell  with read back.    < end of copied text > Chief Complaint:  Patient is a 39y old  Female who presents with a chief complaint of abd pain (10 Dec 2019 07:38)      HPI: Pt is a 40yo F PMH Crohn's diagnosed in , now Humira [id number], 6MP with previous bowel obstructions upon diagnosis, hypothyroidism who presents with 3 days of lwoer abdominal pain. Pt noted symptoms happened in 10/27 and then self resolved. Patient reports she started having recurrent pain in her lower abdomen on Saturday night, associated with one episode of emesis. She thought it gynecologic pain. Pelvic sono a month ago was reported to be normal. The pain continued to travel upwards, intermittent 8-10/10 sharp, associated with abd bloating, decrease PO intake, not significantly improved with Ibuprofen. Pt had no BM for the last 2 days and not eating solids for 3 days and not passing gas- but states she never passes gas as she is on low reisdue diet. Recently completed 10 days course of Clinda for Strep throat and Otitis (Rx by ENT).  Denies recent travel or sick contact.    Per pt, dx in , was on pentasa and then switched to a different provider. She was placed on remicade for 1 year and did well. Then switched to humira as she didnt have time for infusions. She was doing well for 2 years on Humira but not with symptoms on this admission. She has also been on 6MP. Recently in May had normal levels for Humira without antibodies. She has skipped humira doses because of having recent strep throat.    Allergies:  No Known Allergies      Home Medications:    Hospital Medications:  enoxaparin Injectable 40 milliGRAM(s) SubCutaneous daily  lactated ringers. 1000 milliLiter(s) IV Continuous <Continuous>  levothyroxine Injectable 75 MICROGram(s) IV Push at bedtime  morphine  - Injectable 2 milliGRAM(s) IV Push once PRN      PMHX/PSHX:  Migraines  Crohn disease  Hypothyroid  No pertinent past medical history  H/O  section  Pregnancy with history of caesarean section, antepartum  No significant past surgical history      Family history:  Family history of heart disease  Family history of stomach cancer  No pertinent family history in first degree relatives      Social History:     ROS:     General:  No wt loss, fevers, chills, night sweats, fatigue,   Eyes:  Good vision, no reported pain  ENT:  No sore throat, pain, runny nose, dysphagia  CV:  No pain, palpitations, hypo/hypertension  Resp:  No dyspnea, cough, tachypnea, wheezing  GI:  See HPI  :  No pain, bleeding, incontinence, nocturia  Muscle:  No pain, weakness  Neuro:  No weakness, tingling, memory problems  Psych:  No fatigue, insomnia, mood problems, depression  Endocrine:  No polyuria, polydipsia, cold/heat intolerance  Heme:  No petechiae, ecchymosis, easy bruisability  Skin:  No rash, edema      PHYSICAL EXAM:     Vital Signs:  Vital Signs Last 24 Hrs  T(C): 36.7 (10 Dec 2019 07:15), Max: 37 (10 Dec 2019 06:10)  T(F): 98.1 (10 Dec 2019 07:15), Max: 98.6 (10 Dec 2019 06:10)  HR: 69 (10 Dec 2019 07:15) (69 - 86)  BP: 127/83 (10 Dec 2019 07:15) (98/68 - 128/80)  BP(mean): --  RR: 18 (10 Dec 2019 07:15) (16 - 18)  SpO2: 100% (10 Dec 2019 07:15) (99% - 100%)  Daily Height in cm: 162.56 (09 Dec 2019 22:30)    Daily     GENERAL:  Appears stated age, well-groomed, well-nourished, no distress  HEENT:  NC/AT,  conjunctivae clear and pink  CHEST:  Full & symmetric excursion, no increased effort, breath sounds clear  HEART:  Regular rhythm, S1, S2, no murmur/rub/S3/S4, no abdominal bruit, no edema, no JVD  ABDOMEN:  Soft, non-tender, non-distended, normoactive bowel sounds,  no masses , no hepatosplenomegaly  EXTREMITIES:  no cyanosis, clubbing or edema  SKIN:  No rash/erythema/ecchymoses/petechiae/wounds/abscess/warm/dry  NEURO:  Alert, oriented    LABS:                        13.0   12.57 )-----------( 328      ( 10 Dec 2019 01:28 )             37.9     12-10    139  |  101  |  11  ----------------------------<  110<H>  3.8   |  23  |  0.76    Ca    8.8      10 Dec 2019 01:28    TPro  7.6  /  Alb  4.3  /  TBili  0.6  /  DBili  x   /  AST  19  /  ALT  17  /  AlkPhos  60  12-10    LIVER FUNCTIONS - ( 10 Dec 2019 01:28 )  Alb: 4.3 g/dL / Pro: 7.6 g/dL / ALK PHOS: 60 U/L / ALT: 17 U/L / AST: 19 U/L / GGT: x               Amylase Serum--      Lipase serum51       Ammonia--      Imaging:  < from: CT Abdomen and Pelvis w/Cont (06.03.15 @ 13:13) >  Findings: Visualized lung bases are unremarkable.    Liver and spleen are normal in size without focal lesion. There is no   biliary ductal dilatation. The common bile duct is normal in caliber. The   gallbladder is identified. The pancreas is without enlargement or mass.    The adrenal glands are normal. The kidneys enhance bilaterally. There is   no hydronephrosis. There is mild fullness of the upper pole calyces of   the right kidney.    There is no retroperitoneal lymphadenopathy. The visualized vascular   structures are unremarkable.    There is small bowel obstruction secondary to thickening of the distal   terminal ileum and ileocecal valve region. Narrowing is noted with   surrounding soft tissue density. A few nonspecific lymph nodes are seen   within the right lower quadrant. The appendix is not visualized.    The urinary bladder appears normal. The uterus and adnexal regions are   unremarkable.     Examination of the visualized bony structures reveals no abnormality.    IMPRESSION: Small bowel obstruction secondary to concentric thickening of   the distal terminal ileum and ileocecal valve. This could represent   Crohn's disease in this young patient. Alternatively, neoplasm cannot be   excluded. Definitive further evaluationshould be performed with   colonoscopy or CT colonoscopy.    Findings were discussed with Dr. Kellogg on 6/3/2015 at 2:05 PM by Dr. Alivia Terrell  with read back.    < end of copied text >

## 2019-12-10 NOTE — H&P ADULT - NSHPSOCIALHISTORY_GEN_ALL_CORE
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

## 2019-12-10 NOTE — ED PROVIDER NOTE - PHYSICAL EXAMINATION
CONSTITUTIONAL: NAD, awake, alert  HEAD: Normocephalic; atraumatic  ENMT: External appears normal, MMM  CARD: Normal Sl, S2; no audible murmurs  RESP: normal wob, lungs ctab  ABD: soft, non-distended; + RLQ tenderness   MSK: no edema, normal ROM in all four extremities  SKIN: Warm, dry, no rashes  NEURO: aaox3, moving all extremities spontaneously

## 2019-12-10 NOTE — CONSULT NOTE ADULT - ASSESSMENT
Incomplete*** Impression:  # Crohn's Flare with SBO: CT scan showing concentric thickening at distal terminal ileum and IC valve. Humira levels normal and may w/o ab formation. On Humira [id number] and 6MP. Skipped recent humira as she has step throat. Was doing well on Humira for 2 years. Prior on Remicade with success but switched as she didn't want to go on infusions. Currently hemodynamically stable.     Recommendation:  - monitor CBC, CMP  - continue with NGT with intermittent suction  - solumedrol 20mg TID  - check Humira antibody levels now  - will need to get Humira from home for inpatient dose  - monitor serial abdominal exams  - surgery reccs appreciate; medical management for now  - supportive care    Odell Mc  [phone]  Please call/page on call fellow on weekends and weekdays after 5pm

## 2019-12-10 NOTE — CONSULT NOTE ADULT - SUBJECTIVE AND OBJECTIVE BOX
General Surgery Consult  Consulting surgical team: Colorectal Surgery  Consulting attending: Johnson Sofia    HPI: 39F hx Crohn's (on Humira, 6MP, allopurinol), previous bowel obstructions, hypothyroidism who presents with 3 days of abdominal pain. Patient reports she started having pain in her lower abdomen on Saturday night, associated with one episode of emesis. The pain continued to travel upwards and she was not having much of an appetite. She went to her gynecologist today for evaluation, who performed an exam and concluded the pain was unrelated. She also had a pelvic ultrasound 1 month ago which was negative for abnormalities. The pain did not improve so she presented to the ED for further evaluation. Denies fevers, chills, dysuria. Last BM was on . Patient also reports she has a strep throat infection for which she is taking clindamycin.     Used to follow with Dr. Kelly (GI) before switching insurances and receiving care at [facility] for past few years. Now would like to re-establish care at [facility] due to insurance change. Also saw Dr. Sofia in the past who recommended surgical intervention at that time.    Patient reports she smokes cigarettes when she drinks, about 15 drinks per week (1 on the week days and 5-6 when she goes out on the weekends). Occasional marijuana use.      PAST MEDICAL HISTORY:  Migraines  Crohn disease  Hypothyroid  No pertinent past medical history      PAST SURGICAL HISTORY:  H/O  section  Pregnancy with history of caesarean section, antepartum  No significant past surgical history      MEDICATIONS:  morphine  - Injectable 2 milliGRAM(s) IV Push Once      ALLERGIES:  No Known Allergies      VITALS & I/Os:  Vital Signs Last 24 Hrs  T(C): 36.7 (10 Dec 2019 01:59), Max: 36.9 (09 Dec 2019 22:30)  T(F): 98 (10 Dec 2019 01:59), Max: 98.5 (09 Dec 2019 22:30)  HR: 72 (10 Dec 2019 01:59) (72 - 86)  BP: 98/68 (10 Dec 2019 01:59) (98/68 - 128/80)  BP(mean): --  RR: 16 (10 Dec 2019 01:59) (16 - 18)  SpO2: 99% (10 Dec 2019 01:59) (99% - 100%)    I&O's Summary      PHYSICAL EXAM:  General: No acute distress  Respiratory: Nonlabored  Cardiovascular: RRR  Abdominal: Soft, mildly distended, diffusely tender worse in epigastrium associated with involuntary guarding, no rebound.   Extremities: Warm    LABS:                        13.0   12.57 )-----------( 328      ( 10 Dec 2019 01:28 )             37.9     12-10    139  |  101  |  11  ----------------------------<  110<H>  3.8   |  23  |  0.76    Ca    8.8      10 Dec 2019 01:28    TPro  7.6  /  Alb  4.3  /  TBili  0.6  /  DBili  x   /  AST  19  /  ALT  17  /  AlkPhos  60  12-10    Lactate:        IMAGING:  < from: CT Abdomen and Pelvis w/ IV Cont (12.10.19 @ 02:52) >    EXAM:  CT ABDOMEN AND PELVIS IC                            PROCEDURE DATE:  12/10/2019            INTERPRETATION:  CLINICAL INFORMATION: Right lower pain right lower   quadrant abdominal pain, evaluate for appendicitis , Crohn's disease    COMPARISON: CT abdomen pelvis 2017    PROCEDURE:   CT of the Abdomen and Pelvis was performed with intravenous contrast.   Intravenous contrast: 90 ml Omnipaque 350. 10 ml discarded.  Oral contrast: None.  Sagittal and coronal reformats were performed.    FINDINGS:    LOWER CHEST: Within normal limits.    LIVER: Within normal limits.  BILE DUCTS: Normal caliber.  GALLBLADDER: Cholelithiasis.  SPLEEN: Within normal limits.  PANCREAS: Within normal limits.  ADRENALS: Within normal limits.  KIDNEYS/URETERS: Within normal limits.    BLADDER: Within normal limits.  REPRODUCTIVE ORGANS: Approximately 3.6 cm subserosal fibroid with   myometrial component is located in the anterior uterus on the left side   (5:24)    BOWEL: There is terminal ileitis withan active inflammatory stricture   located just proximal to the ileocecal valve, resulting in a small bowel   obstruction.  Normal appendix.  Intermittent underdistention of the   sigmoid colon.    PERITONEUM: Small to moderate amount of ascites in the lower abdomen and   pelvis.  VESSELS: Within normal limits.  RETROPERITONEUM/LYMPH NODES: No lymphadenopathy.    ABDOMINAL WALL: Within normal limits.  BONES: Within normal limits.    IMPRESSION:     Terminal ileitis with active inflammatory stricture located just proximal   to the ileocecal involve, resulting in a small bowel obstruction.  No   enteric fistula, sinus tract or abscess is visualized.    No evidence for acute appendicitis.      ELIZABETH KIRKPATRICK M.D., RADIOLOGY RESIDENT  This document has been electronically signed.  THADDEUS BAILEY M.D.,ATTENDING RADIOLOGIST  This document has been electronically signed. Dec 10 2019  4:10AM    < end of copied text >

## 2019-12-10 NOTE — ED PROVIDER NOTE - OBJECTIVE STATEMENT
39F w/ h/o Crohn's disease presents w/ intermittent severe cramping in the RLQ x3 days n/v. States she had 1 episode of vomiting when symptoms started. States she has not been eating but remains nauseated. Denies fevers, chills, chest pain, SOB, vaginal bleeding. States she saws her OB today who performed a speculum exam and told her the symptoms are not OBGYN related. States she had similar symptoms 1 month ago and a pelvic US was negative at that time. States she is on multiple immunocompromising medications for Crohn's and has frequent otitis, has been taking clinda. States pain is not as severe right now. States these symptoms are not typical of her Crohn's flares

## 2019-12-10 NOTE — ED ADULT NURSE REASSESSMENT NOTE - NS ED NURSE REASSESS COMMENT FT1
Pt received from JOSE Kumari in [facility]. Pt remains stable in the ED. Resting comfortably in bed. VSS. NAD. AOx4. Lungs CTA. Breathing unlabored and spontaneously, sating 100% on room air. S1 and S2 heard. No Peripheral edema. Cap refill 2s. No JVD. Peripheral pulses strong and equal bilaterally. Abdomen soft, nontender and nondistended. Positive bowel sounds in all 4 quadrants. NG tube draining red fluid. hooked up to continuous low wall suction. Pain rated as 0. Admitted to Medicine for SBO. Awaiting bed. Will continue to monitor.

## 2019-12-10 NOTE — ED ADULT NURSE REASSESSMENT NOTE - NS ED NURSE REASSESS COMMENT FT1
Pt remains stable in the ED. Resting comfortably in bed with family at the bedside. A&Ox3. Breathing spontaneously and unlabored. NAD. Pt declines pain medication. NG tube to low wall intermittent suction. Will continue to monitor. Awaiting bed.

## 2019-12-10 NOTE — ED ADULT NURSE NOTE - OBJECTIVE STATEMENT
38 y/o female with PMH SBO presenting to ED for generalized sharp abdominal pain x 2 days. Upon exam pt A&Ox3 gross neuro intact, lungs cta bilaterally, no difficulty speaking in complete sentences, s1s2 heart sounds heard, pulses x 4, de jesus x4, abdomen soft nontender nondistended, skin intact. PT denies chest pain, sob, ha, n/v/d, f/c, urinary symptoms, hematuria

## 2019-12-10 NOTE — H&P ADULT - PROBLEM SELECTOR PLAN 1
likely due to Crohn's   - NGT to LWS, NPO, IVF, pain control prn  - CRS eval appreciated, patient wishes not ot pursue surgical intervention at this time for treatment of Crohn's.  - GI eval emailed

## 2019-12-10 NOTE — ED ADULT NURSE REASSESSMENT NOTE - NS ED NURSE REASSESS COMMENT FT1
Pt remains stable in the ED. Resting comfortably in bed with family at the bedside. A&Ox3. Breathing spontaneously and unlabored. NAD. VSS. Ng tube draining red fluid hooked up to wall suction. Will continue to monitor. Awaiting bed.

## 2019-12-10 NOTE — CONSULT NOTE ADULT - ATTENDING COMMENTS
Patient seen and examined. Agree with above. Terminal ileal Crohns disease flare. patient has been well maintained on humira 40 mg every other week until now. Rec IV steroids and consider increasing dose of humira to every week. Outpatient adalimumab antibody levels within the past 6 months negative.

## 2019-12-10 NOTE — CHART NOTE - NSCHARTNOTEFT_GEN_A_CORE
Patient seen and examined, H&P reviewed. Patient currently comfortable. Continue NGT to LWS for decompression. Solumedrol 20 mg IV q 8. Surgery and GI following, will continue conservative management for now.

## 2019-12-10 NOTE — ED PROVIDER NOTE - NS ED ROS FT
General: denies fever, chills  HENT: denies nasal congestion, rhinorrhea  CV: denies chest pain, palpitations  Resp: denies difficulty breathing, cough  Abdominal: + nausea, + vomiting, + abdominal pain  MSK: denies muscle aches, leg swelling  Neuro: denies headaches, numbness, tingling  Skin: denies rashes, bruises

## 2019-12-10 NOTE — ED PROVIDER NOTE - CLINICAL SUMMARY MEDICAL DECISION MAKING FREE TEXT BOX
39F w/ h/o Crohn's, RLQ pain, r/o appi, will consider OBGYN workup if CT negative given patient's recent evaluations, pregnancy test, labs, patient currently states pain is mild but will treat if pain worsens, reassess

## 2019-12-10 NOTE — H&P ADULT - ASSESSMENT
39F hx Crohn's (on Humira, 6MP, allopurinol), previous bowel obstructions, hypothyroidism who presents with 3 days of abdominal pain a/w terminal ileitis and active inflammation/crohn's flare resulting SBO,

## 2019-12-10 NOTE — CONSULT NOTE ADULT - ASSESSMENT
39F hx Crohn's (on Humira, 6MP, allopurinol), previous bowel obstructions, hypothyroidism who presents with 3 days of abdominal pain found to have terminal ileitis with active inflammatory stricture proximal to the IC valve resulting in SBO    - Nasogastric decompression, NPO, IVF  - GI evaluation for treatment of Crohn's flare  - At this time patient would not like any surgery though we discussed the possibility that if she does not get better she may require a surgical procedure  - Avoid pain medicine prior to abdominal exam, monitor GI fxn  - Smoking cessation    To be d/w Dr. Sofia this AM    SE Bernabe, PGY-2  Colorectal Surgery (Red)  [phone] with questions

## 2019-12-11 ENCOUNTER — TRANSCRIPTION ENCOUNTER (OUTPATIENT)
Age: 39
End: 2019-12-11

## 2019-12-11 PROCEDURE — 99233 SBSQ HOSP IP/OBS HIGH 50: CPT

## 2019-12-11 PROCEDURE — 99232 SBSQ HOSP IP/OBS MODERATE 35: CPT

## 2019-12-11 PROCEDURE — 99232 SBSQ HOSP IP/OBS MODERATE 35: CPT | Mod: GC

## 2019-12-11 RX ADMIN — Medication 20 MILLIGRAM(S): at 15:52

## 2019-12-11 RX ADMIN — Medication 20 MILLIGRAM(S): at 22:01

## 2019-12-11 RX ADMIN — Medication 75 MICROGRAM(S): at 22:01

## 2019-12-11 RX ADMIN — Medication 20 MILLIGRAM(S): at 07:01

## 2019-12-11 NOTE — DISCHARGE NOTE NURSING/CASE MANAGEMENT/SOCIAL WORK - NSDCPEWEB_GEN_ALL_CORE
[facility] for Tobacco Control website --- [email or web address] website --- [email or web address]

## 2019-12-11 NOTE — DISCHARGE NOTE NURSING/CASE MANAGEMENT/SOCIAL WORK - PATIENT PORTAL LINK FT
You can access the FollowMyHealth Patient Portal offered by [facility] by registering at the following website: [email or web address]. By joining [employer]’s FollowMyHealth portal, you will also be able to view your health information using other applications (apps) compatible with our system.

## 2019-12-11 NOTE — DIETITIAN INITIAL EVALUATION ADULT. - PERTINENT MEDS FT
MEDICATIONS  (STANDING):  enoxaparin Injectable 40 milliGRAM(s) SubCutaneous daily  lactated ringers. 1000 milliLiter(s) (100 mL/Hr) IV Continuous <Continuous>  levothyroxine Injectable 75 MICROGram(s) IV Push at bedtime  methylPREDNISolone sodium succinate Injectable 20 milliGRAM(s) IV Push every 8 hours    MEDICATIONS  (PRN):  benzocaine 15 mG/menthol 3.6 mG (Sugar-Free) Lozenge 1 Lozenge Oral every 6 hours PRN Sore Throat  morphine  - Injectable 2 milliGRAM(s) IV Push once PRN Severe Pain (7 - 10)

## 2019-12-11 NOTE — DIETITIAN INITIAL EVALUATION ADULT. - REASON INDICATOR FOR ASSESSMENT
Pt seen for nutrition consult. Information obtained from Comprehensive chart review and patient report.     Chart reviewed, events noted. Pertinent chart information: 38 y/o F hx Crohn's (on Humira, 6MP, allopurinol), previous bowel obstructions upon diagnosis (2015), hypothyroidism who presents with 3 days of abdominal pain admitted w/ terminal ileitis and active inflammation/crohn's flare resulting SBO. Pt seen for nutrition consult. Information obtained from Comprehensive chart review and patient report.     Chart reviewed, events noted. Pertinent chart information: 40 y/o F hx Crohn's (on Humira, 6MP, allopurinol), previous bowel obstructions upon diagnosis (2015), hypothyroidism who presents with 3 days of abdominal pain admitted w/ terminal ileitis and active inflammation/crohn's flare resulting SBO. Pt s/p NGT removal for suction.

## 2019-12-11 NOTE — PROGRESS NOTE ADULT - PROBLEM SELECTOR PLAN 1
-Likely due to Crohn's flare up  -GI recs appreciated  -NGT clamped. Tolerating thus far. Plan for Clear Liquid diet at 1pm  -CRS eval appreciated, patient wishes not ot pursue surgical intervention at this time for treatment of Crohn's.

## 2019-12-11 NOTE — PROGRESS NOTE ADULT - SUBJECTIVE AND OBJECTIVE BOX
GENERAL SURGERY DAILY PROGRESS NOTE:    S:   Patient seen and examined.   No acute events overnight  Reports pain, mildly controlled by pain medication  GI fxn -/-  NGT in palce  OOB    O:   Exam:  Gen: NAD. A&Ox3.  Well developed, alert and cooperative.   Resp: No additional work of breathing.   Card: RR. No peripheral edema or pallor.   Abd: Soft, slightly distended. Tender diffusely. No rebound or gaurding  Ext: WWP. Able to move all extremities equally.    Vital Signs Last 24 Hrs  T(C): 36.9 (11 Dec 2019 05:32), Max: 37.6 (10 Dec 2019 15:45)  T(F): 98.5 (11 Dec 2019 05:32), Max: 99.7 (10 Dec 2019 15:45)  HR: 88 (11 Dec 2019 05:32) (78 - 100)  BP: 110/69 (11 Dec 2019 05:32) (108/81 - 124/76)  BP(mean): --  RR: 17 (11 Dec 2019 05:32) (16 - 18)  SpO2: 96% (11 Dec 2019 05:32) (96% - 100%)    I&O's Detail    10 Dec 2019 07:01  -  11 Dec 2019 07:00  --------------------------------------------------------  IN:    lactated ringers.: 1000 mL  Total IN: 1000 mL    OUT:    Nasoenteral Tube: 600 mL  Total OUT: 600 mL    Total NET: 400 mL          LABS:                        13.0   12.57 )-----------( 328      ( 10 Dec 2019 01:28 )             37.9     12-10    139  |  101  |  11  ----------------------------<  110<H>  3.8   |  23  |  0.76    Ca    8.8      10 Dec 2019 01:28    TPro  7.6  /  Alb  4.3  /  TBili  0.6  /  DBili  x   /  AST  19  /  ALT  17  /  AlkPhos  60  12-10

## 2019-12-11 NOTE — DISCHARGE NOTE NURSING/CASE MANAGEMENT/SOCIAL WORK - NSDCPEEMAIL_GEN_ALL_CORE
[facility] for Tobacco Control email [email or web address]

## 2019-12-11 NOTE — PROGRESS NOTE ADULT - SUBJECTIVE AND OBJECTIVE BOX
Enoch Hardin M.D. Pager Number [phone]    Patient is a 39y old  Female who presents with a chief complaint of abd pain (11 Dec 2019 08:29)      SUBJECTIVE / OVERNIGHT EVENTS:  Pt seen and examined at bedside. No acute events overnight. Denies anymore abdominal pain. States she noted blood in her stool yesterday. No BM today. Pt wanting to eat.   Pt denies cp, palpitations, sob, abd pain, N/V, fever, chills.    ROS:  All other review of systems negative    Allergies    No Known Allergies    Intolerances        MEDICATIONS  (STANDING):  enoxaparin Injectable 40 milliGRAM(s) SubCutaneous daily  lactated ringers. 1000 milliLiter(s) (100 mL/Hr) IV Continuous <Continuous>  levothyroxine Injectable 75 MICROGram(s) IV Push at bedtime  methylPREDNISolone sodium succinate Injectable 20 milliGRAM(s) IV Push every 8 hours    MEDICATIONS  (PRN):  benzocaine 15 mG/menthol 3.6 mG (Sugar-Free) Lozenge 1 Lozenge Oral every 6 hours PRN Sore Throat  morphine  - Injectable 2 milliGRAM(s) IV Push once PRN Severe Pain (7 - 10)      Vital Signs Last 24 Hrs  T(C): 36.9 (11 Dec 2019 05:32), Max: 37.6 (10 Dec 2019 15:45)  T(F): 98.5 (11 Dec 2019 05:32), Max: 99.7 (10 Dec 2019 15:45)  HR: 88 (11 Dec 2019 05:32) (78 - 100)  BP: 110/69 (11 Dec 2019 05:32) (108/81 - 124/76)  BP(mean): --  RR: 17 (11 Dec 2019 05:32) (16 - 18)  SpO2: 96% (11 Dec 2019 05:32) (96% - 100%)  CAPILLARY BLOOD GLUCOSE      POCT Blood Glucose.: 133 mg/dL (10 Dec 2019 16:02)    I&O's Summary    10 Dec 2019 07:01  -  11 Dec 2019 07:00  --------------------------------------------------------  IN: 1000 mL / OUT: 600 mL / NET: 400 mL    11 Dec 2019 07:01  -  11 Dec 2019 12:30  --------------------------------------------------------  IN: 0 mL / OUT: 50 mL / NET: -50 mL        PHYSICAL EXAM:  GENERAL: NAD, well-developed  HEAD:  Atraumatic, Normocephalic  EYES: EOMI, PERRLA, conjunctiva and sclera clear  NECK: Supple, No JVD  CHEST/LUNG: Clear to auscultation bilaterally; No wheeze  HEART: Regular rate and rhythm; No murmurs, rubs, or gallops  ABDOMEN: Soft, Nontender, Nondistended; Bowel sounds present. NGT in place clamped.   EXTREMITIES:  2+ Peripheral Pulses, No clubbing, cyanosis, or edema  NEUROLOGY: AAOx3, non-focal  PSYCH: calm  SKIN: No rashes or lesions    LABS:                        13.0   12.57 )-----------( 328      ( 10 Dec 2019 01:28 )             37.9     12-10    139  |  101  |  11  ----------------------------<  110<H>  3.8   |  23  |  0.76    Ca    8.8      10 Dec 2019 01:28    TPro  7.6  /  Alb  4.3  /  TBili  0.6  /  DBili  x   /  AST  19  /  ALT  17  /  AlkPhos  60  12-10              RADIOLOGY & ADDITIONAL TESTS:  Results Reviewed:   Imaging Personally Reviewed:  Electrocardiogram Personally Reviewed:    COORDINATION OF CARE:  Care Discussed with Consultants/Other Providers [Y/N]:  Prior or Outpatient Records Reviewed [Y/N]:

## 2019-12-11 NOTE — DIETITIAN INITIAL EVALUATION ADULT. - ENERGY INTAKE
Inadequate diet (clear liquids) <5 days Poor PO intake since Saturday (12/7/19) due to abdominal pain.

## 2019-12-11 NOTE — PROGRESS NOTE ADULT - ASSESSMENT
39F hx Crohn's (on Humira, 6MP, allopurinol), previous bowel obstructions, hypothyroidism who presents with 3 days of abdominal pain found to have terminal ileitis with active inflammatory stricture proximal to the IC valve resulting in SBO    Plan:   - Nasogastric decompression, NPO, IVF  - Monitor GI fxn  - f/u GI evaluation for treatment of Crohn's flare  - Abd exam prior to administration of pain medicine   - DVT ppx: lovenox  - Smoking cessation  - OOB  - At this time patient would not like any surgery though we discussed the possibility that if she does not get better she may require a surgical procedure  - Care per primary team    Red Team Surgery// [phone]

## 2019-12-11 NOTE — DIETITIAN INITIAL EVALUATION ADULT. - PHYSICAL APPEARANCE
other (specify) Ht: 64in, Wt: 116.6lbs, BMI: 20.1kg/m2, IBW: 120lbs +/- 10%, %IBW: 97%  Edema: none noted, Skin per nursing flowsheets: intact Nutrition focused physical exam conducted with verbal consent from patient. Pt observed with no overt signs of malnutrition./other (specify)

## 2019-12-11 NOTE — PROGRESS NOTE ADULT - ASSESSMENT
Impression:  # Crohn's Flare with SBO: had BM and symptoms resolved. Exam unremarkable. CT scan showing concentric thickening at distal terminal ileum and IC valve. Humira levels normal in May w/o ab formation. On Humira [id number] and 6MP. Skipped recent humira as she has step throat. Was doing well on Humira for 2 years. Prior on Remicade with success but switched as she didn't want to go on infusions. Currently hemodynamically stable.     Recommendation:  - NGT clamping trial   - continue with solumedrol 20mg TID  - f/u adalimumab antibody levels  - due for Humira dose, will need from home  - may consider increase Humira frequency  - if improved with steroids and tolerated NG tube clamping trial, can advance diet slowly with CLD for now  - monitor abdominal exams  - surgery reccs appreciate; medical management for now  - supportive care

## 2019-12-11 NOTE — DIETITIAN INITIAL EVALUATION ADULT. - ADD RECOMMEND
1) Medical team to advance diet when medically feasible via tolerated route. Consider advancing to clear liquid, followed by low fiber diet as tolerated. If NPO status > 7 days, consider alternate means of nutrition if within pt/family GOC. 2) Reinforce low-fiber diet education as needed - RD available PRN. 3) Monitor for diet advancement, weight trends, labs, and skin integrity. 1) Medical team to advance diet when medically feasible via tolerated route. Consider advancing to low fiber diet as tolerated. 2) Reinforce low-fiber diet education as needed - RD available PRN. 3) Monitor for diet advancement, weight trends, labs, and skin integrity.

## 2019-12-11 NOTE — PROGRESS NOTE ADULT - PROBLEM SELECTOR PLAN 2
-Failed Remicade, currently on Humira  -GI recs appreciated  -Continue Solumedrol 20mg IV Q8hrs  -Okay to restart Humira. Pt will need to have it brought in from home

## 2019-12-11 NOTE — PROGRESS NOTE ADULT - SUBJECTIVE AND OBJECTIVE BOX
Chief Complaint:  Patient is a 39y old  Female who presents with a chief complaint of abd pain (10 Dec 2019 10:14)      Interval Events: No new events. Feels much improved. Had BM yesterday, normal and brown. Tolerating ice chips with NGT in place    Allergies:  No Known Allergies      Hospital Medications:  benzocaine 15 mG/menthol 3.6 mG (Sugar-Free) Lozenge 1 Lozenge Oral every 6 hours PRN  enoxaparin Injectable 40 milliGRAM(s) SubCutaneous daily  lactated ringers. 1000 milliLiter(s) IV Continuous <Continuous>  levothyroxine Injectable 75 MICROGram(s) IV Push at bedtime  methylPREDNISolone sodium succinate Injectable 20 milliGRAM(s) IV Push every 8 hours  morphine  - Injectable 2 milliGRAM(s) IV Push once PRN      PMHX/PSHX:  Migraines  Crohn disease  Hypothyroid  No pertinent past medical history  H/O  section  Pregnancy with history of caesarean section, antepartum  No significant past surgical history      Family history:  Family history of heart disease  Family history of stomach cancer  No pertinent family history in first degree relatives      ROS:     General:  No wt loss, fevers, chills, night sweats, fatigue,   Eyes:  Good vision, no reported pain  ENT:  No sore throat, pain, runny nose, dysphagia  CV:  No pain, palpitations, hypo/hypertension  Resp:  No dyspnea, cough, tachypnea, wheezing  GI:  See HPI  :  No pain, bleeding, incontinence, nocturia  Muscle:  No pain, weakness  Neuro:  No weakness, tingling, memory problems  Psych:  No fatigue, insomnia, mood problems, depression  Endocrine:  No polyuria, polydipsia, cold/heat intolerance  Heme:  No petechiae, ecchymosis, easy bruisability  Skin:  No rash, edema      PHYSICAL EXAM:     Vital Signs:  Vital Signs Last 24 Hrs  T(C): 36.9 (11 Dec 2019 05:32), Max: 37.6 (10 Dec 2019 15:45)  T(F): 98.5 (11 Dec 2019 05:32), Max: 99.7 (10 Dec 2019 15:45)  HR: 88 (11 Dec 2019 05:32) (78 - 100)  BP: 110/69 (11 Dec 2019 05:32) (108/81 - 124/76)  BP(mean): --  RR: 17 (11 Dec 2019 05:32) (16 - 18)  SpO2: 96% (11 Dec 2019 05:32) (96% - 100%)  Daily Height in cm: 162.56 (10 Dec 2019 21:05)    Daily     GENERAL:  appears comfortable, no acute distress  HEENT:  NC/AT,  conjunctivae clear, sclera -anicteric, NGT in place  CHEST:  CTABL, no increased effort  HEART:  Regular rhythm, S1, S2, no murmur/rub/S3/S4  ABDOMEN:  Soft, non-tender, non-distended, normoactive bowel sounds,  no masses ,no hepato-splenomegaly,   EXTREMITIES:  no cyanosis, clubbing or edema  SKIN:  No rash/erythema/ecchymoses/petechiae/wounds  NEURO:  Alert, oriented    LABS:                        13.0   12.57 )-----------( 328      ( 10 Dec 2019 01:28 )             37.9     12-10    139  |  101  |  11  ----------------------------<  110<H>  3.8   |  23  |  0.76    Ca    8.8      10 Dec 2019 01:28    TPro  7.6  /  Alb  4.3  /  TBili  0.6  /  DBili  x   /  AST  19  /  ALT  17  /  AlkPhos  60  12-10    LIVER FUNCTIONS - ( 10 Dec 2019 01:28 )  Alb: 4.3 g/dL / Pro: 7.6 g/dL / ALK PHOS: 60 U/L / ALT: 17 U/L / AST: 19 U/L / GGT: x                   Imaging:    < from: CT Abdomen and Pelvis w/ IV Cont (12.10. @ 02:52) >  FINDINGS:    LOWER CHEST: Within normal limits.    LIVER: Within normal limits.  BILE DUCTS: Normal caliber.  GALLBLADDER: Cholelithiasis.  SPLEEN: Within normal limits.  PANCREAS: Within normal limits.  ADRENALS: Within normal limits.  KIDNEYS/URETERS: Within normal limits.    BLADDER: Within normal limits.  REPRODUCTIVE ORGANS: Approximately 3.6 cm subserosal fibroid with   myometrial component is located in the anterior uterus on the left side   (5:24)    BOWEL: There is terminal ileitis withan active inflammatory stricture   located just proximal to the ileocecal valve, resulting in a small bowel   obstruction.  Normal appendix.  Intermittent underdistention of the   sigmoid colon.    PERITONEUM: Small to moderate amount of ascites in the lower abdomen and   pelvis.  VESSELS: Within normal limits.  RETROPERITONEUM/LYMPH NODES: No lymphadenopathy.    ABDOMINAL WALL: Within normal limits.  BONES: Within normal limits.    IMPRESSION:     Terminal ileitis with active inflammatory stricture located just proximal   to the ileocecal involve, resulting in a small bowel obstruction.  No   enteric fistula, sinus tract or abscess is visualized.    No evidence for acute appendicitis.    < end of copied text >

## 2019-12-11 NOTE — DIETITIAN INITIAL EVALUATION ADULT. - PERTINENT LABORATORY DATA
Glucose 110(H) - 12/10/11  CAPILLARY BLOOD GLUCOSE      POCT Blood Glucose.: 133 mg/dL (10 Dec 2019 16:02)

## 2019-12-11 NOTE — DIETITIAN INITIAL EVALUATION ADULT. - OTHER INFO
Visited pt at bedside. Pt reports usually having a good appetite, but has note been able to eat much since Saturday (12/7/19) due to abdominal pain. Pt reports following a healthy, low-fiber diet at home. States she is well versed with what she should avoid due to hx of Crohn's disease. Pt does not report any known food allergies or intolerances. Pt denies any chewing, swallowing, or self-feeding difficulties. Pt states that she was nauseous and had an episode of emesis on Saturday, but has since improved. Pt endorses taking a Multivitamin at home. Pt reports having lost some weight since the summer. She reports a UBW of 123lbs.     Pt reports that abdominal pain and nausea have improved since yesterday and states that she is ready to eat. Educated patient on diet advancement. Patient made aware. RD remains available as needed and will follow up as indicated/requested by patient. Low-fiber diet education reviewed with pt. Pt refused educational material due to having prior knowledge and educational material home.

## 2019-12-12 ENCOUNTER — TRANSCRIPTION ENCOUNTER (OUTPATIENT)
Age: 39
End: 2019-12-12

## 2019-12-12 VITALS
RESPIRATION RATE: 18 BRPM | OXYGEN SATURATION: 98 % | DIASTOLIC BLOOD PRESSURE: 76 MMHG | TEMPERATURE: 98 F | HEART RATE: 65 BPM | SYSTOLIC BLOOD PRESSURE: 110 MMHG

## 2019-12-12 DIAGNOSIS — N17.9 ACUTE KIDNEY FAILURE, UNSPECIFIED: ICD-10-CM

## 2019-12-12 LAB
ALBUMIN SERPL ELPH-MCNC: 3.2 G/DL — LOW (ref 3.3–5)
ALP SERPL-CCNC: 59 U/L — SIGNIFICANT CHANGE UP (ref 40–120)
ALT FLD-CCNC: 20 U/L — SIGNIFICANT CHANGE UP (ref 10–45)
ANION GAP SERPL CALC-SCNC: 13 MMOL/L — SIGNIFICANT CHANGE UP (ref 5–17)
ANION GAP SERPL CALC-SCNC: 13 MMOL/L — SIGNIFICANT CHANGE UP (ref 5–17)
AST SERPL-CCNC: 24 U/L — SIGNIFICANT CHANGE UP (ref 10–40)
BASOPHILS # BLD AUTO: 0.04 K/UL — SIGNIFICANT CHANGE UP (ref 0–0.2)
BASOPHILS NFR BLD AUTO: 0.4 % — SIGNIFICANT CHANGE UP (ref 0–2)
BILIRUB SERPL-MCNC: 0.8 MG/DL — SIGNIFICANT CHANGE UP (ref 0.2–1.2)
BUN SERPL-MCNC: 11 MG/DL — SIGNIFICANT CHANGE UP (ref 7–23)
BUN SERPL-MCNC: 22 MG/DL — SIGNIFICANT CHANGE UP (ref 7–23)
CALCIUM SERPL-MCNC: 8.4 MG/DL — SIGNIFICANT CHANGE UP (ref 8.4–10.5)
CALCIUM SERPL-MCNC: 9.8 MG/DL — SIGNIFICANT CHANGE UP (ref 8.4–10.5)
CHLORIDE SERPL-SCNC: 102 MMOL/L — SIGNIFICANT CHANGE UP (ref 96–108)
CHLORIDE SERPL-SCNC: 98 MMOL/L — SIGNIFICANT CHANGE UP (ref 96–108)
CO2 SERPL-SCNC: 22 MMOL/L — SIGNIFICANT CHANGE UP (ref 22–31)
CO2 SERPL-SCNC: 28 MMOL/L — SIGNIFICANT CHANGE UP (ref 22–31)
CREAT SERPL-MCNC: 0.67 MG/DL — SIGNIFICANT CHANGE UP (ref 0.5–1.3)
CREAT SERPL-MCNC: 1.34 MG/DL — HIGH (ref 0.5–1.3)
EOSINOPHIL # BLD AUTO: 0.13 K/UL — SIGNIFICANT CHANGE UP (ref 0–0.5)
EOSINOPHIL NFR BLD AUTO: 1.3 % — SIGNIFICANT CHANGE UP (ref 0–6)
GLUCOSE SERPL-MCNC: 111 MG/DL — HIGH (ref 70–99)
GLUCOSE SERPL-MCNC: 137 MG/DL — HIGH (ref 70–99)
HCT VFR BLD CALC: 34.3 % — LOW (ref 34.5–45)
HGB BLD-MCNC: 11 G/DL — LOW (ref 11.5–15.5)
IMM GRANULOCYTES NFR BLD AUTO: 1.5 % — SIGNIFICANT CHANGE UP (ref 0–1.5)
LYMPHOCYTES # BLD AUTO: 0.76 K/UL — LOW (ref 1–3.3)
LYMPHOCYTES # BLD AUTO: 7.6 % — LOW (ref 13–44)
MCHC RBC-ENTMCNC: 30.9 PG — SIGNIFICANT CHANGE UP (ref 27–34)
MCHC RBC-ENTMCNC: 32.1 GM/DL — SIGNIFICANT CHANGE UP (ref 32–36)
MCV RBC AUTO: 96.3 FL — SIGNIFICANT CHANGE UP (ref 80–100)
MONOCYTES # BLD AUTO: 1.22 K/UL — HIGH (ref 0–0.9)
MONOCYTES NFR BLD AUTO: 12.2 % — SIGNIFICANT CHANGE UP (ref 2–14)
NEUTROPHILS # BLD AUTO: 7.67 K/UL — HIGH (ref 1.8–7.4)
NEUTROPHILS NFR BLD AUTO: 77 % — SIGNIFICANT CHANGE UP (ref 43–77)
PLATELET # BLD AUTO: 312 K/UL — SIGNIFICANT CHANGE UP (ref 150–400)
POTASSIUM SERPL-MCNC: 3.9 MMOL/L — SIGNIFICANT CHANGE UP (ref 3.5–5.3)
POTASSIUM SERPL-MCNC: 3.9 MMOL/L — SIGNIFICANT CHANGE UP (ref 3.5–5.3)
POTASSIUM SERPL-SCNC: 3.9 MMOL/L — SIGNIFICANT CHANGE UP (ref 3.5–5.3)
POTASSIUM SERPL-SCNC: 3.9 MMOL/L — SIGNIFICANT CHANGE UP (ref 3.5–5.3)
PROT SERPL-MCNC: 6.6 G/DL — SIGNIFICANT CHANGE UP (ref 6–8.3)
RBC # BLD: 3.56 M/UL — LOW (ref 3.8–5.2)
RBC # FLD: 13.1 % — SIGNIFICANT CHANGE UP (ref 10.3–14.5)
SODIUM SERPL-SCNC: 137 MMOL/L — SIGNIFICANT CHANGE UP (ref 135–145)
SODIUM SERPL-SCNC: 139 MMOL/L — SIGNIFICANT CHANGE UP (ref 135–145)
WBC # BLD: 9.97 K/UL — SIGNIFICANT CHANGE UP (ref 3.8–10.5)
WBC # FLD AUTO: 9.97 K/UL — SIGNIFICANT CHANGE UP (ref 3.8–10.5)

## 2019-12-12 PROCEDURE — 85027 COMPLETE CBC AUTOMATED: CPT

## 2019-12-12 PROCEDURE — 82962 GLUCOSE BLOOD TEST: CPT

## 2019-12-12 PROCEDURE — 99285 EMERGENCY DEPT VISIT HI MDM: CPT

## 2019-12-12 PROCEDURE — 87581 M.PNEUMON DNA AMP PROBE: CPT

## 2019-12-12 PROCEDURE — 99232 SBSQ HOSP IP/OBS MODERATE 35: CPT

## 2019-12-12 PROCEDURE — 82397 CHEMILUMINESCENT ASSAY: CPT

## 2019-12-12 PROCEDURE — 99239 HOSP IP/OBS DSCHRG MGMT >30: CPT

## 2019-12-12 PROCEDURE — 80053 COMPREHEN METABOLIC PANEL: CPT

## 2019-12-12 PROCEDURE — 87798 DETECT AGENT NOS DNA AMP: CPT

## 2019-12-12 PROCEDURE — 99232 SBSQ HOSP IP/OBS MODERATE 35: CPT | Mod: GC

## 2019-12-12 PROCEDURE — 84702 CHORIONIC GONADOTROPIN TEST: CPT

## 2019-12-12 PROCEDURE — 80048 BASIC METABOLIC PNL TOTAL CA: CPT

## 2019-12-12 PROCEDURE — 80145 DRUG ASSAY ADALIMUMAB: CPT

## 2019-12-12 PROCEDURE — 87486 CHLMYD PNEUM DNA AMP PROBE: CPT

## 2019-12-12 PROCEDURE — 87633 RESP VIRUS 12-25 TARGETS: CPT

## 2019-12-12 PROCEDURE — 71045 X-RAY EXAM CHEST 1 VIEW: CPT

## 2019-12-12 PROCEDURE — 83690 ASSAY OF LIPASE: CPT

## 2019-12-12 PROCEDURE — 74177 CT ABD & PELVIS W/CONTRAST: CPT

## 2019-12-12 RX ORDER — SODIUM CHLORIDE 9 MG/ML
1000 INJECTION, SOLUTION INTRAVENOUS
Refills: 0 | Status: DISCONTINUED | OUTPATIENT
Start: 2019-12-12 | End: 2019-12-12

## 2019-12-12 RX ADMIN — Medication 20 MILLIGRAM(S): at 06:39

## 2019-12-12 RX ADMIN — SODIUM CHLORIDE 100 MILLILITER(S): 9 INJECTION, SOLUTION INTRAVENOUS at 06:39

## 2019-12-12 NOTE — DISCHARGE NOTE PROVIDER - NSDCFUSCHEDAPPT_GEN_ALL_CORE_FT
MIHAI MUNIZ ; 12/24/2019 ; [facility] [address] ; 03/05/2020 ; [facility] [address] [facility] MIHAI MUNIZ ; 12/24/2019 ; [facility] [address] ; 03/05/2020 ; [facility] [address] [facility] MIHAI MUNIZ ; 12/24/2019 ; [facility] [address] ; 03/05/2020 ; [facility] [address] [facility] MIHAI MUNIZ ; 12/24/2019 ; [facility] [address] ; 01/21/2020 ; [facility] [address] [facility]

## 2019-12-12 NOTE — DISCHARGE NOTE PROVIDER - HOSPITAL COURSE
39F hx Crohn's (on Humira, 6MP, allopurinol), previous bowel obstructions, hypothyroidism who presents with 3 days of abdominal pain a/w terminal ileitis and active inflammation/crohn's flare resulting SBO,         Problem/Plan - 1:    ·  Problem: SBO (small bowel obstruction).  Plan: -Likely due to Crohn's flare up    -GI recs appreciated    -NGT clamped. Tolerating thus far. Plan for Clear Liquid diet at 1pm    -CRS eval appreciated, patient wishes not ot pursue surgical intervention at this time for treatment of Crohn's.          Problem/Plan - 2:    ·  Problem: Crohn's disease of both small and large intestine with intestinal obstruction.  Plan: -Failed Remicade, currently on Humira    -GI recs appreciated    -Continue Solumedrol 20mg IV Q8hrs    -Okay to restart Humira. Pt will need to have it brought in from home.          Problem/Plan - 3:    ·  Problem: Other specified hypothyroidism.  Plan: -Continue IV Synthroid for now    -If tolerating diet, then switch back to PO dose. 39F hx Crohn's (on Humira, 6MP, allopurinol), previous bowel obstructions, hypothyroidism who presents with 3 days of abdominal pain a/w terminal ileitis and active inflammation/crohn's flare resulting SBO,         Problem/Plan - 1:    ·  Problem: SBO (small bowel obstruction).  Plan: -Likely due to Crohn's flare up    -GI recs appreciated    -NGT clamped. Tolerating thus far. Plan for Clear Liquid diet tolerated and diet advanced.     -CRS eval appreciated, patient wishes not ot pursue surgical intervention at this time for treatment of Crohn's.          Problem/Plan - 2:    ·  Problem: Crohn's disease of both small and large intestine with intestinal obstruction.  Plan: -Failed Remicade, currently on Humira    -GI recs appreciated    -Continue Solumedrol 20mg IV Q8hrs    -restart Humira outpatient        Discharge home with outpatient follow up with her pcp and GI 39F hx Crohn's (on Humira, 6MP, allopurinol), previous bowel obstructions, hypothyroidism who presents with 3 days of abdominal pain a/w terminal ileitis and active inflammation/crohn's flare resulting SBO,         Problem/Plan - 1:    ·  Problem: SBO (small bowel obstruction).  Plan: -Likely due to Crohn's flare up    -GI recs appreciated    -NGT clamped. Tolerating thus far. Plan for Clear Liquid diet tolerated and diet advanced.     -CRS eval appreciated, patient wishes not ot pursue surgical intervention at this time for treatment of Crohn's.          Problem/Plan - 2:    ·  Problem: Crohn's disease of both small and large intestine with intestinal obstruction.  Plan: -Failed Remicade, currently on Humira    -GI recs appreciated    - Solumedrol 20mg IV Q8hrs changed to prednisone 4 tab(s) orally once a day - upon discharge switch to prednisone 40mg po daily with a 5mg taper each week (35mg for 1 week, then 30mg for 1 week, etc...)    -restart Humira outpatient        Discharge home with outpatient follow up with her pcp and pt has GI appointment with Dr Kelly 39F hx Crohn's (on Humira, 6MP, allopurinol), previous bowel obstructions, hypothyroidism who presented with 3 days of abdominal pain a/w terminal ileitis and active inflammation/crohn's flare resulting SBO. Pt was evaluated by GI and Colorectal surgery service. Pt with refusal to undergo surgical intervention. Conservative management was started. NG Tube was placed and put on intermittent suction while kept NPO. She was started on high dose IV Steroids. Pt had resolution of abdominal pain. She was started on CLD and advanced to low fiber diet which she tolerated well and was having BMs. NG Tube was removed. Pt was started on Prolonged Prednisone taper and will follow up as outpatient.

## 2019-12-12 NOTE — PROGRESS NOTE ADULT - ASSESSMENT
Impression:  # Crohn's Flare with SBO: had BM and symptoms resolved. Exam unremarkable. CT scan showing concentric thickening at distal terminal ileum and IC valve. Humira levels normal in May w/o ab formation. On Humira n2hshcy and 6MP. Skipped recent humira as she has step throat. Was doing well on Humira for 2 years. Prior on Remicade with success but switched as she didn't want to go on infusions. Currently hemodynamically stable.     Recommendation:  - continue with solumedrol 20mg TID for now  - advance to full liquid diet and then low residue diet for lunch  - f/u adalimumab antibody levels (likely as outpatient)  - due for Humira dose, will need from home  - may consider increase Humira frequency as outpatient  - supportive care  - if patient tolerates diet and ambulating well and having BM; okay from GI perspective for outpatient followup  - upon discharge switch to prednisone 40mg po daily with a 5mg taper each week (35mg for 1 week, then 30mg for 1 week, etc...)  - please have patient make appointment for follow up in the GI office (Attending Clinic- [phone]) Impression:  # Crohn's Flare with SBO: had BM and symptoms resolved. Exam unremarkable. CT scan showing concentric thickening at distal terminal ileum and IC valve. Humira levels normal in May w/o ab formation. On Humira f4rjyhn and 6MP. Skipped recent humira as she has step throat. Was doing well on Humira for 2 years. Prior on Remicade with success but switched as she didn't want to go on infusions. Currently hemodynamically stable.     Recommendation:  - continue with solumedrol 20mg TID for now  - advance to low residue diet for lunch  - f/u adalimumab antibody levels (likely as outpatient)  - due for Humira dose, will need from home  - may consider increase Humira frequency as outpatient  - supportive care  - if patient tolerates diet and ambulating well and having BM; okay from GI perspective for outpatient followup  - upon discharge switch to prednisone 40mg po daily with a 5mg taper each week (35mg for 1 week, then 30mg for 1 week, etc...)  - please have patient make appointment for follow up in the GI office (Attending Clinic- [phone])

## 2019-12-12 NOTE — PROGRESS NOTE ADULT - SUBJECTIVE AND OBJECTIVE BOX
Chief Complaint:  Patient is a 39y old  Female who presents with a chief complaint of abd pain (12 Dec 2019 08:08)      Interval Events: NG tube removed and patient tolerating CLD. Had BM yesterday and today. Denies n/v, abdominal pain, fever or chills.    Allergies:  No Known Allergies      Hospital Medications:  benzocaine 15 mG/menthol 3.6 mG (Sugar-Free) Lozenge 1 Lozenge Oral every 6 hours PRN  enoxaparin Injectable 40 milliGRAM(s) SubCutaneous daily  lactated ringers. 1000 milliLiter(s) IV Continuous <Continuous>  levothyroxine Injectable 75 MICROGram(s) IV Push at bedtime  methylPREDNISolone sodium succinate Injectable 20 milliGRAM(s) IV Push every 8 hours  morphine  - Injectable 2 milliGRAM(s) IV Push once PRN      PMHX/PSHX:  Migraines  Crohn disease  Hypothyroid  No pertinent past medical history  H/O  section  Pregnancy with history of caesarean section, antepartum  No significant past surgical history      Family history:  Family history of heart disease  Family history of stomach cancer  No pertinent family history in first degree relatives      ROS:     General:  No wt loss, fevers, chills, night sweats, fatigue,   Eyes:  Good vision, no reported pain  ENT:  No sore throat, pain, runny nose, dysphagia  CV:  No pain, palpitations, hypo/hypertension  Resp:  No dyspnea, cough, tachypnea, wheezing  GI:  See HPI  :  No pain, bleeding, incontinence, nocturia  Muscle:  No pain, weakness  Neuro:  No weakness, tingling, memory problems  Psych:  No fatigue, insomnia, mood problems, depression  Endocrine:  No polyuria, polydipsia, cold/heat intolerance  Heme:  No petechiae, ecchymosis, easy bruisability  Skin:  No rash, edema      PHYSICAL EXAM:     Vital Signs:  Vital Signs Last 24 Hrs  T(C): 36.7 (12 Dec 2019 04:24), Max: 37.3 (11 Dec 2019 14:17)  T(F): 98.1 (12 Dec 2019 04:24), Max: 99.1 (11 Dec 2019 14:17)  HR: 58 (12 Dec 2019 04:24) (58 - 84)  BP: 108/62 (12 Dec 2019 04:24) (108/62 - 120/79)  BP(mean): --  RR: 18 (12 Dec 2019 04:24) (17 - 18)  SpO2: 98% (12 Dec 2019 04:24) (96% - 98%)  Daily     Daily Weight in k.4 (11 Dec 2019 14:44)    GENERAL:  appears comfortable, no acute distress  HEENT:  NC/AT,  conjunctivae clear, sclera -anicteric  CHEST:  CTABL, no increased effort  HEART:  Regular rhythm, S1, S2, no murmur/rub/S3/S4  ABDOMEN:  Soft, non-tender, non-distended, normoactive bowel sounds,  no masses ,no hepato-splenomegaly,   EXTREMITIES:  no cyanosis, clubbing or edema  SKIN:  No rash/erythema/ecchymoses/petechiae/wounds  NEURO:  Alert, oriented    LABS:        137  |  102  |  22  ----------------------------<  111<H>  3.9   |  22  |  1.34<H>    Ca    8.4      12 Dec 2019 07:08    TPro  6.6  /  Alb  3.2<L>  /  TBili  0.8  /  DBili  x   /  AST  24  /  ALT  20  /  AlkPhos  59  12    LIVER FUNCTIONS - ( 12 Dec 2019 07:08 )  Alb: 3.2 g/dL / Pro: 6.6 g/dL / ALK PHOS: 59 U/L / ALT: 20 U/L / AST: 24 U/L / GGT: x                   Imaging:

## 2019-12-12 NOTE — PROGRESS NOTE ADULT - PROBLEM SELECTOR PLAN 1
-Likely due to Crohn's flare up  -GI recs appreciated  -S/p NGT removal and tolerating PO and having BMs  -CRS eval appreciated, patient wishes not ot pursue surgical intervention at this time for treatment of Crohn's.

## 2019-12-12 NOTE — DISCHARGE NOTE PROVIDER - PROVIDER TOKENS
FREE:[LAST:[Dr Chaz Garibay],PHONE:[phone]],FAX:[(   )    -],FOLLOWUP:[1-3 days]] FREE:[LAST:[Dr Chaz Garibay],PHONE:[phone]],FAX:[(   )    -],FOLLOWUP:[1-3 days]],PROVIDER:[TOKEN:[[id number]]]

## 2019-12-12 NOTE — PROGRESS NOTE ADULT - ASSESSMENT
39F hx Crohn's (on Humira, 6MP, allopurinol), previous bowel obstructions, hypothyroidism who presents with 3 days of abdominal pain a/w terminal ileitis and active inflammation/crohn's flare resulting SBO

## 2019-12-12 NOTE — DISCHARGE NOTE PROVIDER - CARE PROVIDERS DIRECT ADDRESSES
,DirectAddress_Unknown ,DirectAddress_Unknown,[email or web address]

## 2019-12-12 NOTE — PROGRESS NOTE ADULT - PROBLEM SELECTOR PLAN 3
-Failed Remicade, currently on Humira  -GI recs appreciated  -Plan for prolonged Prednisone taper upon discharge; 40mg daily x 1 week and then decrease by 5mg every week  -Restart Humira. Pt will need to have dose increase as outpatient

## 2019-12-12 NOTE — PROGRESS NOTE ADULT - SUBJECTIVE AND OBJECTIVE BOX
RED SURGERY DAILY PROGRESS NOTE:       SUBJECTIVE/ROS: Patient seen and examined at bedside.  Patient states her pain has much improved.   She is tolerating CLD.  She reports passing flatus, having BMs.      MEDICATIONS  (STANDING):  enoxaparin Injectable 40 milliGRAM(s) SubCutaneous daily  lactated ringers. 1000 milliLiter(s) (100 mL/Hr) IV Continuous <Continuous>  levothyroxine Injectable 75 MICROGram(s) IV Push at bedtime  methylPREDNISolone sodium succinate Injectable 20 milliGRAM(s) IV Push every 8 hours    MEDICATIONS  (PRN):  benzocaine 15 mG/menthol 3.6 mG (Sugar-Free) Lozenge 1 Lozenge Oral every 6 hours PRN Sore Throat  morphine  - Injectable 2 milliGRAM(s) IV Push once PRN Severe Pain (7 - 10)      OBJECTIVE:    Vital Signs Last 24 Hrs  T(C): 36.7 (12 Dec 2019 04:24), Max: 37.3 (11 Dec 2019 14:17)  T(F): 98.1 (12 Dec 2019 04:24), Max: 99.1 (11 Dec 2019 14:17)  HR: 58 (12 Dec 2019 04:24) (58 - 84)  BP: 108/62 (12 Dec 2019 04:24) (108/62 - 120/79)  BP(mean): --  RR: 18 (12 Dec 2019 04:24) (17 - 18)  SpO2: 98% (12 Dec 2019 04:24) (96% - 98%)        I&O's Detail    11 Dec 2019 07:01  -  12 Dec 2019 07:00  --------------------------------------------------------  IN:    lactated ringers.: 1200 mL  Total IN: 1200 mL    OUT:    Nasoenteral Tube: 50 mL  Total OUT: 50 mL    Total NET: 1150 mL          Daily     Daily Weight in k.4 (11 Dec 2019 14:44)    LABS:        137  |  102  |  22  ----------------------------<  111<H>  3.9   |  22  |  1.34<H>    Ca    8.4      12 Dec 2019 07:08    TPro  6.6  /  Alb  3.2<L>  /  TBili  0.8  /  DBili  x   /  AST  24  /  ALT  20  /  AlkPhos  59  12-12                  PHYSICAL EXAM:  Gen: NAD. A&Ox3.  Well developed, alert and cooperative.   Resp: No additional work of breathing.   Abd: Soft, slightly distended, non-tender. No rebound or guarding.  Ext: WWP. Able to move all extremities equally.

## 2019-12-12 NOTE — DISCHARGE NOTE PROVIDER - NSDCMRMEDTOKEN_GEN_ALL_CORE_FT
allopurinol 100 mg oral tablet: 1 tab(s) orally once a day, skipping Saturdays  Humira 40 mg/0.8 mL subcutaneous kit: 1 dose(s) subcutaneous every other week  Loestrin 21 1.5/30 oral tablet: 1 tab(s) orally once a day    Note: Pt has home OCP with them  mercaptopurine 50 mg oral tablet: 0.5 tab(s) orally once a day, skipping Saturdays  Synthroid 100 mcg (0.1 mg) oral tablet: 1 tab(s) orally once a day allopurinol 100 mg oral tablet: 1 tab(s) orally once a day, skipping Saturdays  Humira 40 mg/0.8 mL subcutaneous kit: 1 dose(s) subcutaneous every other week  Loestrin 21 1.5/30 oral tablet: 1 tab(s) orally once a day    Note: Pt has home OCP with them  mercaptopurine 50 mg oral tablet: 0.5 tab(s) orally once a day, skipping Saturdays  predniSONE 10 mg oral tablet: 4 tab(s) orally once a day x 7 days then taper by 5mg taper each week (35mg for 1 week, then 30mg for 1 week, etc...)    Synthroid 100 mcg (0.1 mg) oral tablet: 1 tab(s) orally once a day

## 2019-12-12 NOTE — PROGRESS NOTE ADULT - ASSESSMENT
39F hx Crohn's (on Humira, 6MP, allopurinol), previous bowel obstructions, hypothyroidism who presents with 3 days of abdominal pain found to have terminal ileitis with active inflammatory stricture proximal to the IC valve resulting in SBO.    Plan:     - No acute surgical intervention at this time  - Would recommend advancing to low residue diet  - d/c IVF as patient tolerating PO  - Monitor GI fxn  - f/u GI for medical management of crohn's flare   - Abd exam prior to administration of pain medicine   - DVT ppx: lovenox  - Smoking cessation  - OOB  - Care per primary team    Red Team Surgery// [phone] 39F hx Crohn's (on Humira, 6MP, allopurinol), previous bowel obstructions, hypothyroidism who presents with 3 days of abdominal pain found to have terminal ileitis with active inflammatory stricture proximal to the IC valve resulting in SBO.    Plan:     - No acute surgical intervention at this time  - Would recommend advancing to low residue diet  - d/c IVF as patient tolerating PO  - Monitor GI fxn  - f/u GI for medical management of crohn's flare   - DVT ppx: lovenox  - Smoking cessation  - OOB  - OK to d/c from a surgical standpoint if patient able to tolerate regular diet (x2 meals)   - Care per primary team    Red Team Surgery// [phone]

## 2019-12-12 NOTE — PROGRESS NOTE ADULT - SUBJECTIVE AND OBJECTIVE BOX
Enoch Hardin M.D. Pager Number [phone]    Patient is a 39y old  Female who presents with a chief complaint of abd pain (12 Dec 2019 08:42)      SUBJECTIVE / OVERNIGHT EVENTS:  Pt seen and examined at bedside. No acute events overnight. Tolerating low fiber diet without complaints of N/V, abdominal pain or diarrhea. Endorses regular BMs.   Pt denies cp, palpitations, sob, abd pain, N/V, fever, chills.    ROS:  All other review of systems negative    Allergies    No Known Allergies    Intolerances        MEDICATIONS  (STANDING):  enoxaparin Injectable 40 milliGRAM(s) SubCutaneous daily  lactated ringers. 1000 milliLiter(s) (150 mL/Hr) IV Continuous <Continuous>  levothyroxine Injectable 75 MICROGram(s) IV Push at bedtime  methylPREDNISolone sodium succinate Injectable 20 milliGRAM(s) IV Push every 8 hours    MEDICATIONS  (PRN):  benzocaine 15 mG/menthol 3.6 mG (Sugar-Free) Lozenge 1 Lozenge Oral every 6 hours PRN Sore Throat  morphine  - Injectable 2 milliGRAM(s) IV Push once PRN Severe Pain (7 - 10)      Vital Signs Last 24 Hrs  T(C): 36.7 (12 Dec 2019 04:24), Max: 37.3 (11 Dec 2019 14:17)  T(F): 98.1 (12 Dec 2019 04:24), Max: 99.1 (11 Dec 2019 14:17)  HR: 58 (12 Dec 2019 04:24) (58 - 84)  BP: 108/62 (12 Dec 2019 04:24) (108/62 - 120/79)  BP(mean): --  RR: 18 (12 Dec 2019 04:24) (17 - 18)  SpO2: 98% (12 Dec 2019 04:24) (96% - 98%)  CAPILLARY BLOOD GLUCOSE        I&O's Summary    11 Dec 2019 07:01  -  12 Dec 2019 07:00  --------------------------------------------------------  IN: 1200 mL / OUT: 50 mL / NET: 1150 mL        PHYSICAL EXAM:  GENERAL: NAD, well-developed  CHEST/LUNG: Clear to auscultation bilaterally; No wheeze  HEART: Regular rate and rhythm; No murmurs, rubs, or gallops  ABDOMEN: Soft, Nontender, Nondistended; Bowel sounds present  EXTREMITIES:  2+ Peripheral Pulses, No clubbing, cyanosis, or edema  NEUROLOGY: AAOx3, non-focal  PSYCH: calm  SKIN: No rashes or lesions    LABS:                        11.0   9.97  )-----------( 312      ( 12 Dec 2019 08:30 )             34.3     12-12    137  |  102  |  22  ----------------------------<  111<H>  3.9   |  22  |  1.34<H>    Ca    8.4      12 Dec 2019 07:08    TPro  6.6  /  Alb  3.2<L>  /  TBili  0.8  /  DBili  x   /  AST  24  /  ALT  20  /  AlkPhos  59  12-12              RADIOLOGY & ADDITIONAL TESTS:  Results Reviewed:   Imaging Personally Reviewed:  Electrocardiogram Personally Reviewed:    COORDINATION OF CARE:  Care Discussed with Consultants/Other Providers [Y/N]:  Prior or Outpatient Records Reviewed [Y/N]:

## 2019-12-12 NOTE — SBIRT NOTE ADULT - NSSBIRTALCPOSREINDET_GEN_A_CORE
Positive reinforcement provided given patient currently within healthy guidelines. Education materials reviewed.

## 2019-12-12 NOTE — DISCHARGE NOTE PROVIDER - NSDCCPCAREPLAN_GEN_ALL_CORE_FT
PRINCIPAL DISCHARGE DIAGNOSIS  Diagnosis: SBO (small bowel obstruction)  Assessment and Plan of Treatment:       SECONDARY DISCHARGE DIAGNOSES  Diagnosis: Crohn's disease of both small and large intestine with intestinal obstruction  Assessment and Plan of Treatment: Crohn's disease of both small and large intestine with intestinal obstruction PRINCIPAL DISCHARGE DIAGNOSIS  Diagnosis: SBO (small bowel obstruction)  Assessment and Plan of Treatment: resolved  return to the hospital if worsens fever, intractable abdominal pain or vomiting or worsens      SECONDARY DISCHARGE DIAGNOSES  Diagnosis: SHARI (acute kidney injury)  Assessment and Plan of Treatment: resolved with IV fluids. Follow up with PCp in a few days    Diagnosis: Crohn's disease of both small and large intestine with intestinal obstruction  Assessment and Plan of Treatment: Crohn's disease of both small and large intestine with intestinal obstruction- resolved. plan as above  Keep gastroenterology appointment with Dr Edmundo Kelly

## 2019-12-12 NOTE — DISCHARGE NOTE PROVIDER - CARE PROVIDER_API CALL
Dr Chaz Garibay,   Phone: [phone]  Fax: (   )    -  Follow Up Time: 1-3 days Dr Chaz Garibay,   Phone: [phone]  Fax: (   )    -  Follow Up Time: 1-3 days    Edmundo Kelly)  Gastroenterology; Internal Medicine  [address] [place] [id number]  Phone: [phone]  Fax: [phone]  Follow Up Time:

## 2019-12-15 LAB
ADALIMUMAB AB SERPL-MCNC: <25 NG/ML — SIGNIFICANT CHANGE UP
ADALIMUMAB SERPL-MCNC: 13 UG/ML — SIGNIFICANT CHANGE UP

## 2019-12-19 ENCOUNTER — INBOUND DOCUMENT (OUTPATIENT)
Age: 39
End: 2019-12-19

## 2019-12-24 ENCOUNTER — APPOINTMENT (OUTPATIENT)
Dept: OBGYN | Facility: CLINIC | Age: 39
End: 2019-12-24

## 2019-12-31 ENCOUNTER — APPOINTMENT (OUTPATIENT)
Dept: OBGYN | Facility: CLINIC | Age: 39
End: 2019-12-31
Payer: COMMERCIAL

## 2019-12-31 VITALS — BODY MASS INDEX: 20.49 KG/M2 | WEIGHT: 120 LBS | HEIGHT: 64 IN

## 2019-12-31 DIAGNOSIS — Z87.898 PERSONAL HISTORY OF OTHER SPECIFIED CONDITIONS: ICD-10-CM

## 2019-12-31 DIAGNOSIS — R10.9 UNSPECIFIED ABDOMINAL PAIN: ICD-10-CM

## 2019-12-31 PROCEDURE — 57454 BX/CURETT OF CERVIX W/SCOPE: CPT

## 2019-12-31 NOTE — PROCEDURE
[Colposcopy] : colposcopy [ASCUS] : atypical squamous cells of undetermined significance [HPV high risk] : PCR positive for high risk HPV [Patient] : patient [Risks] : risks [Benefits] : benefits [Bleeding] : bleeding [Pap Performed] : a cervical Pap smear was not performed [SCJ Fully Visulized] : the squamocolumnar junction was not fully visualized [Normal Staining] : normal staining [No Abnormalities] : no abnormalities seen [Biopsies Taken: # ___] : no biopsies were taken of the cervix [No Complications] : there were no complications [ECC Done] : Endocervical curettage was performed.

## 2020-01-07 LAB — CORE LAB BIOPSY: NORMAL

## 2020-01-12 ENCOUNTER — FORM ENCOUNTER (OUTPATIENT)
Age: 40
End: 2020-01-12

## 2020-01-13 ENCOUNTER — OUTPATIENT (OUTPATIENT)
Dept: OUTPATIENT SERVICES | Facility: HOSPITAL | Age: 40
LOS: 1 days | End: 2020-01-13
Payer: COMMERCIAL

## 2020-01-13 ENCOUNTER — APPOINTMENT (OUTPATIENT)
Dept: ULTRASOUND IMAGING | Facility: IMAGING CENTER | Age: 40
End: 2020-01-13
Payer: COMMERCIAL

## 2020-01-13 DIAGNOSIS — E06.3 AUTOIMMUNE THYROIDITIS: ICD-10-CM

## 2020-01-13 DIAGNOSIS — Z98.89 OTHER SPECIFIED POSTPROCEDURAL STATES: Chronic | ICD-10-CM

## 2020-01-13 PROCEDURE — 76536 US EXAM OF HEAD AND NECK: CPT

## 2020-01-13 PROCEDURE — 76536 US EXAM OF HEAD AND NECK: CPT | Mod: 26

## 2020-01-21 ENCOUNTER — OTHER (OUTPATIENT)
Age: 40
End: 2020-01-21

## 2020-01-24 ENCOUNTER — APPOINTMENT (OUTPATIENT)
Dept: GASTROENTEROLOGY | Facility: CLINIC | Age: 40
End: 2020-01-24
Payer: COMMERCIAL

## 2020-01-24 VITALS
SYSTOLIC BLOOD PRESSURE: 118 MMHG | BODY MASS INDEX: 21 KG/M2 | WEIGHT: 123 LBS | HEART RATE: 67 BPM | OXYGEN SATURATION: 98 % | DIASTOLIC BLOOD PRESSURE: 68 MMHG | RESPIRATION RATE: 15 BRPM | HEIGHT: 64 IN

## 2020-01-24 PROCEDURE — 99214 OFFICE O/P EST MOD 30 MIN: CPT

## 2020-01-24 PROCEDURE — 99205 OFFICE O/P NEW HI 60 MIN: CPT

## 2020-01-24 NOTE — HISTORY OF PRESENT ILLNESS
[de-identified] : 39-year-old female, approximately 5 year history of ileal Crohn's disease with stenosis, episodes of bowel obstruction\par Most recent hospitalization 2 weeks ago at [facility], 4 days, NG tube decompression for bowel obstruction\par No evidence of abscess or phlegmon on CAT scan\par Inflammation around terminal ileum\par \par Patient notably on combination therapy with Humira and mercaptopurine at the time of admission which she has been using for a couple of years\par Until recently under the care of Uriel Richardson.  Previously on Remicade\par \par Review of medical records shows largely unchanged inflammatory disease of the terminal ileum on repeated MRIs\par Blood level testing for Humira drug level during hospitalization, when she was "overdue" for her shot shows excellent drug level, absent antibody level\par \par Patient discharged home her prednisone taper now down to 10 mg daily\par \par Surgical evaluation during hospitalization\par \par Minimal prehospitalization prodrome\par Other than a few days earlier when she developed some abdominal pains she describes herself is feeling well most of the time\par No diarrhea\par Weight stable\par Chronic arthritic complaints of the hands unimproved with biologic therapy, unimproved with mercaptopurine, only better with prednisone\par \par Social history smoker, single mother works for [employer]

## 2020-01-24 NOTE — PHYSICAL EXAM
[General Appearance - Alert] : alert [General Appearance - In No Acute Distress] : in no acute distress [Sclera] : the sclera and conjunctiva were normal [Outer Ear] : the ears and nose were normal in appearance [PERRL With Normal Accommodation] : pupils were equal in size, round, and reactive to light [Extraocular Movements] : extraocular movements were intact [Jugular Venous Distention Increased] : there was no jugular-venous distention [Oropharynx] : the oropharynx was normal [Neck Cervical Mass (___cm)] : no neck mass was observed [Neck Appearance] : the appearance of the neck was normal [Thyroid Diffuse Enlargement] : the thyroid was not enlarged [Thyroid Nodule] : there were no palpable thyroid nodules [Auscultation Breath Sounds / Voice Sounds] : lungs were clear to auscultation bilaterally [Heart Rate And Rhythm] : heart rate was normal and rhythm regular [Heart Sounds Gallop] : no gallops [Heart Sounds] : normal S1 and S2 [Murmurs] : no murmurs [Heart Sounds Pericardial Friction Rub] : no pericardial rub [Bowel Sounds] : normal bowel sounds [Abdomen Soft] : soft [Edema] : there was no peripheral edema [Abdomen Mass (___ Cm)] : no abdominal mass palpated [Abdomen Tenderness] : non-tender [Axillary Lymph Nodes Enlarged Bilaterally] : axillary [Cervical Lymph Nodes Enlarged Anterior Bilaterally] : anterior cervical [Cervical Lymph Nodes Enlarged Posterior Bilaterally] : posterior cervical [Supraclavicular Lymph Nodes Enlarged Bilaterally] : supraclavicular [Femoral Lymph Nodes Enlarged Bilaterally] : femoral [Inguinal Lymph Nodes Enlarged Bilaterally] : inguinal [No CVA Tenderness] : no ~M costovertebral angle tenderness [No Spinal Tenderness] : no spinal tenderness [Motor Tone] : muscle strength and tone were normal [Musculoskeletal - Swelling] : no joint swelling seen [Nail Clubbing] : no clubbing  or cyanosis of the fingernails [Abnormal Walk] : normal gait [Skin Turgor] : normal skin turgor [] : no rash [Skin Color & Pigmentation] : normal skin color and pigmentation [Affect] : the affect was normal [Impaired Insight] : insight and judgment were intact [Oriented To Time, Place, And Person] : oriented to person, place, and time

## 2020-01-24 NOTE — ASSESSMENT
[FreeTextEntry1] : 39-year-old female ileal Crohn's disease with years of stenosis, recurrent clinical complaints of bowel obstruction despite combination biologic and immune modulator therapy, confirmed excellent Humira drug levels\par \par Plan\par Lengthy discussion with patient regarding her options\par As explained to the patient, do not feel that escalation of Humira will achieve any benefit given her persistently good drug levels\par While not impossible, unlikely that switching to alternate biologic therapy such as stelara, or entyvio would yield the desired effect, ie resolution of inflammation and stricture\par Highly likely that her stenosis is significantly fibrotic in nature and unlikely to respond to medical therapy\par Her greatest desire is to avoid and emergency surgery with even a temporary ileostomy\par We will avoid such a scenario would be to have a elective resection primary re re anastomosis and then to resume biologic therapy prophylactically postop\par \par She expresses understanding and agreement with this plan\par Pre-surgical colonoscopy indications risks benefits and alternatives reviewed\par She expresses good understanding and agreement\par \par She will followup with surgeon ASAP\par \par Continue prednisone taper\par Continue low residue diet

## 2020-01-24 NOTE — REASON FOR VISIT
[Post Hospitalization] : a post hospitalization visit [FreeTextEntry1] : Ileal Crohn's disease complicated by stenosis and bowel obstruction

## 2020-02-19 ENCOUNTER — APPOINTMENT (OUTPATIENT)
Dept: GASTROENTEROLOGY | Facility: AMBULATORY MEDICAL SERVICES | Age: 40
End: 2020-02-19
Payer: COMMERCIAL

## 2020-02-19 PROCEDURE — 45380 COLONOSCOPY AND BIOPSY: CPT

## 2020-02-25 ENCOUNTER — TRANSCRIPTION ENCOUNTER (OUTPATIENT)
Age: 40
End: 2020-02-25

## 2020-03-04 ENCOUNTER — APPOINTMENT (OUTPATIENT)
Dept: COLORECTAL SURGERY | Facility: CLINIC | Age: 40
End: 2020-03-04
Payer: COMMERCIAL

## 2020-03-04 VITALS
SYSTOLIC BLOOD PRESSURE: 100 MMHG | BODY MASS INDEX: 20.49 KG/M2 | WEIGHT: 120 LBS | DIASTOLIC BLOOD PRESSURE: 60 MMHG | RESPIRATION RATE: 12 BRPM | HEART RATE: 72 BPM | HEIGHT: 64 IN

## 2020-03-04 PROCEDURE — 99215 OFFICE O/P EST HI 40 MIN: CPT

## 2020-03-04 RX ORDER — SODIUM SULFATE, POTASSIUM SULFATE, MAGNESIUM SULFATE 17.5; 3.13; 1.6 G/ML; G/ML; G/ML
17.5-3.13-1.6 SOLUTION, CONCENTRATE ORAL
Qty: 1 | Refills: 0 | Status: DISCONTINUED | COMMUNITY
Start: 2020-01-24 | End: 2020-03-04

## 2020-03-04 RX ORDER — LORAZEPAM 1 MG/1
1 TABLET ORAL
Qty: 90 | Refills: 0 | Status: DISCONTINUED | COMMUNITY
Start: 2019-08-13 | End: 2020-03-04

## 2020-03-04 RX ORDER — CITALOPRAM HYDROBROMIDE 10 MG/1
10 TABLET, FILM COATED ORAL DAILY
Qty: 30 | Refills: 2 | Status: DISCONTINUED | COMMUNITY
Start: 2019-08-13 | End: 2020-03-04

## 2020-03-05 NOTE — PHYSICAL EXAM
[Normal Breath Sounds] : Normal breath sounds [Normal Heart Sounds] : normal heart sounds [Normal Rate and Rhythm] : normal rate and rhythm [No Edema] : No edema [Alert] : alert [Oriented to Person] : oriented to person [Oriented to Place] : oriented to place [Oriented to Time] : oriented to time [Calm] : calm [de-identified] : flat +BS NT/ND Pfann scar [de-identified] : NC/AT [de-identified] : +ROM [de-identified] : intact

## 2020-03-05 NOTE — REVIEW OF SYSTEMS
[Negative] : Endocrine [Chest Pain] : no chest pain [Shortness Of Breath] : no shortness of breath [Easy Bleeding] : no tendency for easy bleeding [Easy Bruising] : no tendency for easy bruising [FreeTextEntry9] : hand/feet pain

## 2020-03-05 NOTE — ASSESSMENT
[FreeTextEntry1] : 39-year-old white female who presents today for consultation regarding stricturing terminal ileal Crohn's disease.\par \par I had seen the patient in consultation approximately 3 years ago. She was diagnosed with Crohn's disease 5 years ago and has developed progressive, stricturing terminal ileal Crohn's disease. She has had extra-intestinal manifestations including joint pains. Patient has been on biologic agents for 3 years. She was recently admitted to the hospital with another small bowel obstruction related to the stricturing terminal ileal Crohn's disease but this resolved rapidly. She had a repeat colonoscopy revealing a very strictured ileocecal valve which could not be intubated even with the pediatric colonoscope.\par \par I had recommended surgery 3 years ago and I certainly believe she is progressing and again I am recommending surgery. We discussed a laparoscopic ileocolic resection which can hopefully be performed without an ostomy. We talked about all aspects of surgery including anticipated operative time, hospital stay and time to complete recuperation. Risks, alternatives and benefits including but not limited to anastomotic leak, wound infection and deep venous thrombosis were discussed.\par \par Patient has very little childcare assistance at home and no one else drives. She is . It seems that the soonest she can have the surgery done would be in July. Hopefully she will not have another obstruction before that time. She will call to schedule surgery in May.

## 2020-03-05 NOTE — HISTORY OF PRESENT ILLNESS
[FreeTextEntry1] : 38yo WF diagnosed with SB Crohns disease @5 years ago. +medical mgmt. December 2019 patient hospitalized with SBO requiring NGT decompression.  CT scan +terminal ileum inflammation/narrowing.  F/U colonoscopy in Jan 2020 revealed stenotic IC valve, which could not be intubated with peds scope  Presents for surgical consultation.\par \par Experiencing RLQ twinges. +food intake. +formed stool/mucus discharge. Denies N/V.

## 2020-04-07 ENCOUNTER — APPOINTMENT (OUTPATIENT)
Dept: COLORECTAL SURGERY | Facility: HOSPITAL | Age: 40
End: 2020-04-07

## 2020-04-25 ENCOUNTER — MESSAGE (OUTPATIENT)
Age: 40
End: 2020-04-25

## 2020-04-27 ENCOUNTER — APPOINTMENT (OUTPATIENT)
Dept: ENDOCRINOLOGY | Facility: CLINIC | Age: 40
End: 2020-04-27
Payer: COMMERCIAL

## 2020-04-27 VITALS
OXYGEN SATURATION: 99 % | HEART RATE: 88 BPM | HEIGHT: 64 IN | BODY MASS INDEX: 20.49 KG/M2 | DIASTOLIC BLOOD PRESSURE: 80 MMHG | SYSTOLIC BLOOD PRESSURE: 100 MMHG | WEIGHT: 120 LBS

## 2020-04-27 PROCEDURE — 36415 COLL VENOUS BLD VENIPUNCTURE: CPT

## 2020-04-27 PROCEDURE — 99213 OFFICE O/P EST LOW 20 MIN: CPT | Mod: 25

## 2020-04-27 RX ORDER — ALLOPURINOL 200 MG/1
TABLET ORAL
Refills: 0 | Status: DISCONTINUED | COMMUNITY
End: 2020-04-27

## 2020-04-27 RX ORDER — LEVOTHYROXINE SODIUM 100 UG/1
100 TABLET ORAL DAILY
Qty: 30 | Refills: 3 | Status: DISCONTINUED | COMMUNITY
Start: 2019-10-22 | End: 2020-04-27

## 2020-04-27 RX ORDER — NORETHINDRONE ACETATE AND ETHINYL ESTRADIOL 1.5-30(21)
1.5-3 KIT ORAL DAILY
Qty: 84 | Refills: 3 | Status: DISCONTINUED | COMMUNITY
Start: 2020-01-07 | End: 2020-04-27

## 2020-04-27 NOTE — HISTORY OF PRESENT ILLNESS
[FreeTextEntry1] : 40 yo F with PMH Hashimoto's thyroiditis, Crohn's disease, thyroid nodule\par \par Prior Endocrinologist: Dr Charles\par diagnosed with hypothyroidism 2013 in the setting of hair loss\par she is on levothyroxine 100 mcg qd. \par She denies FHx of thyroid disease. \par She denies history of radiation to the head or neck. \par She denies history of amiodarone or lithium use.\par No recent steroids \par reports history of thyroid nodule - reportedly benign FNA\par thyroid US 1/13/20 did not have thyroid nodules\par \par \par

## 2020-04-27 NOTE — ASSESSMENT
[FreeTextEntry1] : 40 yo F with PMH Hashimoto's thyroiditis, Crohn's disease, thyroid nodule\par \par 1. Hashimoto's thyroiditis - will check TFTs. \par \par 2. Thyroid nodule - no nodules on thyroid US

## 2020-05-04 LAB
25(OH)D3 SERPL-MCNC: 52.4 NG/ML
ALBUMIN SERPL ELPH-MCNC: 4.7 G/DL
ALP BLD-CCNC: 40 U/L
ALT SERPL-CCNC: 14 U/L
ANION GAP SERPL CALC-SCNC: 13 MMOL/L
AST SERPL-CCNC: 18 U/L
BASOPHILS # BLD AUTO: 0.04 K/UL
BASOPHILS NFR BLD AUTO: 0.6 %
BILIRUB SERPL-MCNC: 0.4 MG/DL
BUN SERPL-MCNC: 14 MG/DL
CALCIUM SERPL-MCNC: 9.7 MG/DL
CHLORIDE SERPL-SCNC: 100 MMOL/L
CHOLEST SERPL-MCNC: 222 MG/DL
CHOLEST/HDLC SERPL: 2.8 RATIO
CK SERPL-CCNC: 71 U/L
CO2 SERPL-SCNC: 24 MMOL/L
CREAT SERPL-MCNC: 0.77 MG/DL
DHEA-S SERPL-MCNC: 63.8 UG/DL
EOSINOPHIL # BLD AUTO: 0.21 K/UL
EOSINOPHIL NFR BLD AUTO: 3.2 %
ESTRADIOL SERPL-MCNC: 162 PG/ML
FOLATE SERPL-MCNC: >20 NG/ML
FSH SERPL-MCNC: 1.6 IU/L
GLUCOSE SERPL-MCNC: 94 MG/DL
HCT VFR BLD CALC: 39 %
HDLC SERPL-MCNC: 80 MG/DL
HGB BLD-MCNC: 12.9 G/DL
IMM GRANULOCYTES NFR BLD AUTO: 0.3 %
LDLC SERPL CALC-MCNC: 118 MG/DL
LH SERPL-ACNC: 8 IU/L
LYMPHOCYTES # BLD AUTO: 1.33 K/UL
LYMPHOCYTES NFR BLD AUTO: 20.4 %
MAN DIFF?: NORMAL
MCHC RBC-ENTMCNC: 31.5 PG
MCHC RBC-ENTMCNC: 33.1 GM/DL
MCV RBC AUTO: 95.4 FL
MONOCYTES # BLD AUTO: 0.5 K/UL
MONOCYTES NFR BLD AUTO: 7.7 %
NEUTROPHILS # BLD AUTO: 4.42 K/UL
NEUTROPHILS NFR BLD AUTO: 67.8 %
PLATELET # BLD AUTO: NORMAL K/UL
POTASSIUM SERPL-SCNC: 3.7 MMOL/L
PROT SERPL-MCNC: 7.4 G/DL
RBC # BLD: 4.09 M/UL
RBC # FLD: 12.3 %
SHBG SERPL-SCNC: 290 NMOL/L
SODIUM SERPL-SCNC: 137 MMOL/L
T4 FREE SERPL-MCNC: 1.5 NG/DL
TESTOST BND SERPL-MCNC: <0.04 NG/DL
TESTOST SERPL-MCNC: 5.6 NG/DL
TESTOSTERONE BIOAVAILABLE: 1.3 NG/DL
TESTOSTERONE TOTAL S: 23 NG/DL
THYROGLOB AB SERPL-ACNC: <20 IU/ML
THYROPEROXIDASE AB SERPL IA-ACNC: 15.4 IU/ML
TRIGL SERPL-MCNC: 122 MG/DL
TSH SERPL-ACNC: 1.77 UIU/ML
VIT B12 SERPL-MCNC: 559 PG/ML
WBC # FLD AUTO: 6.52 K/UL

## 2020-05-05 ENCOUNTER — APPOINTMENT (OUTPATIENT)
Dept: OBGYN | Facility: CLINIC | Age: 40
End: 2020-05-05
Payer: COMMERCIAL

## 2020-05-05 ENCOUNTER — ASOB RESULT (OUTPATIENT)
Age: 40
End: 2020-05-05

## 2020-05-05 VITALS — DIASTOLIC BLOOD PRESSURE: 77 MMHG | WEIGHT: 120 LBS | SYSTOLIC BLOOD PRESSURE: 122 MMHG | BODY MASS INDEX: 20.6 KG/M2

## 2020-05-05 DIAGNOSIS — Z82.49 FAMILY HISTORY OF ISCHEMIC HEART DISEASE AND OTHER DISEASES OF THE CIRCULATORY SYSTEM: ICD-10-CM

## 2020-05-05 PROCEDURE — 99214 OFFICE O/P EST MOD 30 MIN: CPT

## 2020-05-05 PROCEDURE — 76830 TRANSVAGINAL US NON-OB: CPT

## 2020-05-07 LAB
C TRACH RRNA SPEC QL NAA+PROBE: NOT DETECTED
HPV HIGH+LOW RISK DNA PNL CVX: DETECTED
N GONORRHOEA RRNA SPEC QL NAA+PROBE: NOT DETECTED
SOURCE AMPLIFICATION: NORMAL

## 2020-05-09 ENCOUNTER — APPOINTMENT (OUTPATIENT)
Dept: DISASTER EMERGENCY | Facility: CLINIC | Age: 40
End: 2020-05-09

## 2020-05-10 LAB
SARS-COV-2 IGG SERPL IA-ACNC: <0.1 INDEX
SARS-COV-2 IGG SERPL QL IA: NEGATIVE

## 2020-05-12 LAB — CYTOLOGY CVX/VAG DOC THIN PREP: ABNORMAL

## 2020-05-19 NOTE — ED ADULT NURSE NOTE - PT NEEDS ASSIST
"[facility]    Incoming VM from pt: \"I wanted to let you know that I took my one pill I was supposed to take\", \"everything is feeling fine\".     Will cont to monitor MHT.     Maureen Castro, PANKAJN, RN, PHN, HNB-BC   5/19/2020 at 4:22 PM      "
[facility]  My Health Tracker HF Alert    Wt gain of 3# overnight and 1# above goal.    Pt told me he feels fine, though increased BLE edema, for which he's performing mechanical compression, as well as elevation. He stated that his abd is soft and has no increase in swelling.     I offered him option to review with provider for potential extra diuretic, he preferred to wait to see what his wt/sx are tomorrow and if still swollen/wt up, review with [facility] at that time. He told me his goal is to continue to be more physically active, in addn.    Today's home wt: 256#  Goal wt: 245-255#  Recent wts:  MyHealth Tracker CHF Flowsheet My weight today is:   5/11/2020 253   5/13/2020 253   5/15/2020 254   5/16/2020 254   5/17/2020 253   5/18/2020 256     Heart Failure sx: swelling reported    Diuretic plan: bumex 2mg BID, eplerenone 50mg BID, with KCL 30meq TID    To follow-up with C[facility] KARYN mid summer, not yet scheduled.    Karen Stark RN BSN   2:41 PM 05/18/20          
[facility]    Below notes reviewed with Anette Steward CNP who rec'd pt take extra 1mg bumex x1 today.    Reveiwed plan with pt who agreed to plan.    Will follow MHT surveys to assess response.    Karen Stark RN BSN   9:04 AM 05/19/20        
[facility]    Pt's wt remains at 256# and 1# above goal.    Most recent BMP in Care Everywhere showed nml lytes, nml GFR and BUN, and creat 1.3.    Msg'd Anette Steward CNP to advise if new orders needed.    Karen Stark RN BSN   8:51 AM 05/19/20            
no

## 2020-06-08 ENCOUNTER — APPOINTMENT (OUTPATIENT)
Dept: MAMMOGRAPHY | Facility: CLINIC | Age: 40
End: 2020-06-08
Payer: COMMERCIAL

## 2020-06-08 ENCOUNTER — OUTPATIENT (OUTPATIENT)
Dept: OUTPATIENT SERVICES | Facility: HOSPITAL | Age: 40
LOS: 1 days | End: 2020-06-08
Payer: COMMERCIAL

## 2020-06-08 ENCOUNTER — RESULT REVIEW (OUTPATIENT)
Age: 40
End: 2020-06-08

## 2020-06-08 DIAGNOSIS — Z00.8 ENCOUNTER FOR OTHER GENERAL EXAMINATION: ICD-10-CM

## 2020-06-08 DIAGNOSIS — Z98.89 OTHER SPECIFIED POSTPROCEDURAL STATES: Chronic | ICD-10-CM

## 2020-06-08 PROCEDURE — 77063 BREAST TOMOSYNTHESIS BI: CPT

## 2020-06-08 PROCEDURE — 77067 SCR MAMMO BI INCL CAD: CPT

## 2020-06-08 PROCEDURE — 77063 BREAST TOMOSYNTHESIS BI: CPT | Mod: 26

## 2020-06-08 PROCEDURE — 77067 SCR MAMMO BI INCL CAD: CPT | Mod: 26

## 2020-06-16 DIAGNOSIS — Z01.818 ENCOUNTER FOR OTHER PREPROCEDURAL EXAMINATION: ICD-10-CM

## 2020-06-18 ENCOUNTER — OUTPATIENT (OUTPATIENT)
Dept: OUTPATIENT SERVICES | Facility: HOSPITAL | Age: 40
LOS: 1 days | End: 2020-06-18
Payer: COMMERCIAL

## 2020-06-18 VITALS
HEIGHT: 64.5 IN | OXYGEN SATURATION: 98 % | WEIGHT: 123.9 LBS | TEMPERATURE: 99 F | HEART RATE: 82 BPM | DIASTOLIC BLOOD PRESSURE: 79 MMHG | SYSTOLIC BLOOD PRESSURE: 122 MMHG | RESPIRATION RATE: 16 BRPM

## 2020-06-18 DIAGNOSIS — Z98.89 OTHER SPECIFIED POSTPROCEDURAL STATES: Chronic | ICD-10-CM

## 2020-06-18 DIAGNOSIS — Z98.890 OTHER SPECIFIED POSTPROCEDURAL STATES: Chronic | ICD-10-CM

## 2020-06-18 DIAGNOSIS — K50.90 CROHN'S DISEASE, UNSPECIFIED, WITHOUT COMPLICATIONS: ICD-10-CM

## 2020-06-18 DIAGNOSIS — Z01.818 ENCOUNTER FOR OTHER PREPROCEDURAL EXAMINATION: ICD-10-CM

## 2020-06-18 DIAGNOSIS — Z29.9 ENCOUNTER FOR PROPHYLACTIC MEASURES, UNSPECIFIED: ICD-10-CM

## 2020-06-18 LAB
A1C WITH ESTIMATED AVERAGE GLUCOSE RESULT: 5.1 % — SIGNIFICANT CHANGE UP (ref 4–5.6)
ANION GAP SERPL CALC-SCNC: 12 MMOL/L — SIGNIFICANT CHANGE UP (ref 5–17)
BLD GP AB SCN SERPL QL: NEGATIVE — SIGNIFICANT CHANGE UP
BUN SERPL-MCNC: 8 MG/DL — SIGNIFICANT CHANGE UP (ref 7–23)
CALCIUM SERPL-MCNC: 9 MG/DL — SIGNIFICANT CHANGE UP (ref 8.4–10.5)
CHLORIDE SERPL-SCNC: 103 MMOL/L — SIGNIFICANT CHANGE UP (ref 96–108)
CO2 SERPL-SCNC: 23 MMOL/L — SIGNIFICANT CHANGE UP (ref 22–31)
CREAT SERPL-MCNC: 0.75 MG/DL — SIGNIFICANT CHANGE UP (ref 0.5–1.3)
ESTIMATED AVERAGE GLUCOSE: 100 MG/DL — SIGNIFICANT CHANGE UP (ref 68–114)
GLUCOSE SERPL-MCNC: 90 MG/DL — SIGNIFICANT CHANGE UP (ref 70–99)
HCT VFR BLD CALC: 38.3 % — SIGNIFICANT CHANGE UP (ref 34.5–45)
HGB BLD-MCNC: 12.5 G/DL — SIGNIFICANT CHANGE UP (ref 11.5–15.5)
MCHC RBC-ENTMCNC: 31.2 PG — SIGNIFICANT CHANGE UP (ref 27–34)
MCHC RBC-ENTMCNC: 32.6 GM/DL — SIGNIFICANT CHANGE UP (ref 32–36)
MCV RBC AUTO: 95.5 FL — SIGNIFICANT CHANGE UP (ref 80–100)
NRBC # BLD: 0 /100 WBCS — SIGNIFICANT CHANGE UP (ref 0–0)
PLATELET # BLD AUTO: SIGNIFICANT CHANGE UP (ref 150–400)
POTASSIUM SERPL-MCNC: 3.8 MMOL/L — SIGNIFICANT CHANGE UP (ref 3.5–5.3)
POTASSIUM SERPL-SCNC: 3.8 MMOL/L — SIGNIFICANT CHANGE UP (ref 3.5–5.3)
RBC # BLD: 4.01 M/UL — SIGNIFICANT CHANGE UP (ref 3.8–5.2)
RBC # FLD: 13.5 % — SIGNIFICANT CHANGE UP (ref 10.3–14.5)
RH IG SCN BLD-IMP: POSITIVE — SIGNIFICANT CHANGE UP
SODIUM SERPL-SCNC: 138 MMOL/L — SIGNIFICANT CHANGE UP (ref 135–145)
WBC # BLD: 5.2 K/UL — SIGNIFICANT CHANGE UP (ref 3.8–10.5)
WBC # FLD AUTO: 5.2 K/UL — SIGNIFICANT CHANGE UP (ref 3.8–10.5)

## 2020-06-18 PROCEDURE — 80048 BASIC METABOLIC PNL TOTAL CA: CPT

## 2020-06-18 PROCEDURE — 86900 BLOOD TYPING SEROLOGIC ABO: CPT

## 2020-06-18 PROCEDURE — G0463: CPT

## 2020-06-18 PROCEDURE — 83036 HEMOGLOBIN GLYCOSYLATED A1C: CPT

## 2020-06-18 PROCEDURE — 85027 COMPLETE CBC AUTOMATED: CPT

## 2020-06-18 PROCEDURE — 86901 BLOOD TYPING SEROLOGIC RH(D): CPT

## 2020-06-18 PROCEDURE — 86850 RBC ANTIBODY SCREEN: CPT

## 2020-06-18 RX ORDER — CEFOTETAN DISODIUM 1 G
2 VIAL (EA) INJECTION ONCE
Refills: 0 | Status: DISCONTINUED | OUTPATIENT
Start: 2020-06-23 | End: 2020-06-24

## 2020-06-18 NOTE — H&P PST ADULT - NSANTHOSAYNRD_GEN_A_CORE
No. LANRE screening performed.  STOP BANG Legend: 0-2 = LOW Risk; 3-4 = INTERMEDIATE Risk; 5-8 = HIGH Risk

## 2020-06-18 NOTE — H&P PST ADULT - NSICDXPASTSURGICALHX_GEN_ALL_CORE_FT
PAST SURGICAL HISTORY:  H/O carpal tunnel repair left- 2015    H/O  section with tubal ligation, 2015

## 2020-06-18 NOTE — H&P PST ADULT - NSICDXPROBLEM_GEN_ALL_CORE_FT
PROBLEM DIAGNOSES  Problem: Crohn's disease without complication  Assessment and Plan: Pre- Op Instructions discussed   Labs sent   Cooovid test schdulded     Problem: Need for prophylactic measure  Assessment and Plan: PROBLEM DIAGNOSES  Problem: Crohn's disease without complication  Assessment and Plan: Laparoscopic Ileocolic resection on 6/23/20   -Pre- Op Instructions discussed ,Pre- Op meds ordered ,Incetive -spirometer instructions discussed   -Labs sent   -Covid test schdulded 6/20/20     Problem: Need for prophylactic measure  Assessment and Plan: The Caprini score indicates this patient is at risk for a VTE event (score 3-5).  Most surgical patients in this group would benefit from pharmacologic prophylaxis.  The surgical team will determine the balance between VTE risk and bleeding risk

## 2020-06-18 NOTE — H&P PST ADULT - ASSESSMENT
CAPRINI SCORE [CLOT updated 18]    AGE RELATED RISK FACTORS                                                       MOBILITY RELATED FACTORS  [ ] Age 41-60 years                                            (1 Point)                    [ ] Bed rest                                                        (1 Point)  [ ] Age: 61-74 years                                           (2 Points)                  [ ] Plaster cast                                                   (2 Points)  [ ] Age= 75 years                                              (3 Points)                    [ ] Bed bound for more than 72 hours                 (2 Points)    DISEASE RELATED RISK FACTORS                                               GENDER SPECIFIC FACTORS  [ ] Edema in the lower extremities                       (1 Point)              [ ] Pregnancy                                                     (1 Point)  [ ] Varicose veins                                               (1 Point)                     [ ] Post-partum < 6 weeks                                   (1 Point)             [ ] BMI > 25 Kg/m2                                            (1 Point)                     [ ] Hormonal therapy  or oral contraception          (1 Point)                 [ ] Sepsis (in the previous month)                        (1 Point)               [ ] History of pregnancy complications                 (1 point)  [ ] Pneumonia or serious lung disease                                               [ ] Unexplained or recurrent                     (1 Point)           (in the previous month)                               (1 Point)  [ ] Abnormal pulmonary function test                     (1 Point)                 SURGERY RELATED RISK FACTORS  [ ] Acute myocardial infarction                              (1 Point)               [ ]  Section                                             (1 Point)  [ ] Congestive heart failure (in the previous month)  (1 Point)      [ ] Minor surgery                                                  (1 Point)   [x ] Inflammatory bowel disease                             (1 Point)               [ ] Arthroscopic surgery                                        (2 Points)  [ ] Central venous access                                      (2 Points)                [x ] General surgery lasting more than 45 minutes (2 points)  [ ] Present or previous malignancy                     (2 Points)                [ ] Elective arthroplasty                                         (5 points)    [ ] Stroke (in the previous month)                          (5 Points)                                                                                                                                                           HEMATOLOGY RELATED FACTORS                                                 TRAUMA RELATED RISK FACTORS  [ ] Prior episodes of VTE                                     (3 Points)                [ ] Fracture of the hip, pelvis, or leg                       (5 Points)  [ ] Positive family history for VTE                         (3 Points)             [ ] Acute spinal cord injury (in the previous month)  (5 Points)  [ ] Prothrombin 96732 A                                     (3 Points)               [ ] Paralysis  (less than 1 month)                             (5 Points)  [ ] Factor V Leiden                                             (3 Points)                  [ ] Multiple Trauma within 1 month                        (5 Points)  [ ] Lupus anticoagulants                                     (3 Points)                                                           [ ] Anticardiolipin antibodies                               (3 Points)                                                       [ ] High homocysteine in the blood                      (3 Points)                                             [ ] Other congenital or acquired thrombophilia      (3 Points)                                                [ ] Heparin induced thrombocytopenia                  (3 Points)                                     Total Score [   3       ]

## 2020-06-18 NOTE — H&P PST ADULT - HISTORY OF PRESENT ILLNESS
40 F hx Crohn's (on Humira, 6MP, allopurinol) last attack was in 12/2029 , previous bowel obstructions upon diagnosis (2015), hypothyroidism . Presents to PST for scheduled Laproscopic Ileocolic Resection on 6/23/20.    Covid test scheduled 6/20/20 at [facility] 40 F hx Crohn's (on Humira, 6MP, allopurinol) last attack was in 12/2029 , previous bowel obstructions upon diagnosis (2015), hypothyroidism . Presents to PST for scheduled Laparoscopic Ileocolic Resection on 6/23/20.    Covid test scheduled 6/20/20 at [facility]

## 2020-06-20 ENCOUNTER — APPOINTMENT (OUTPATIENT)
Dept: DISASTER EMERGENCY | Facility: CLINIC | Age: 40
End: 2020-06-20

## 2020-06-22 ENCOUNTER — TRANSCRIPTION ENCOUNTER (OUTPATIENT)
Age: 40
End: 2020-06-22

## 2020-06-22 LAB — SARS-COV-2 N GENE NPH QL NAA+PROBE: NOT DETECTED

## 2020-06-23 ENCOUNTER — INPATIENT (INPATIENT)
Facility: HOSPITAL | Age: 40
LOS: 1 days | Discharge: HOME | DRG: 331 | End: 2020-06-25
Attending: SURGERY | Admitting: SURGERY
Payer: COMMERCIAL

## 2020-06-23 ENCOUNTER — APPOINTMENT (OUTPATIENT)
Dept: COLORECTAL SURGERY | Facility: HOSPITAL | Age: 40
End: 2020-06-23
Payer: COMMERCIAL

## 2020-06-23 ENCOUNTER — RESULT REVIEW (OUTPATIENT)
Age: 40
End: 2020-06-23

## 2020-06-23 VITALS
TEMPERATURE: 98 F | HEIGHT: 64.5 IN | SYSTOLIC BLOOD PRESSURE: 137 MMHG | HEART RATE: 85 BPM | WEIGHT: 123.9 LBS | RESPIRATION RATE: 16 BRPM | DIASTOLIC BLOOD PRESSURE: 79 MMHG | OXYGEN SATURATION: 99 %

## 2020-06-23 DIAGNOSIS — K50.90 CROHN'S DISEASE, UNSPECIFIED, WITHOUT COMPLICATIONS: ICD-10-CM

## 2020-06-23 DIAGNOSIS — Z98.890 OTHER SPECIFIED POSTPROCEDURAL STATES: Chronic | ICD-10-CM

## 2020-06-23 DIAGNOSIS — Z01.818 ENCOUNTER FOR OTHER PREPROCEDURAL EXAMINATION: ICD-10-CM

## 2020-06-23 DIAGNOSIS — Z98.89 OTHER SPECIFIED POSTPROCEDURAL STATES: Chronic | ICD-10-CM

## 2020-06-23 LAB — GLUCOSE BLDC GLUCOMTR-MCNC: 93 MG/DL — SIGNIFICANT CHANGE UP (ref 70–99)

## 2020-06-23 PROCEDURE — 88309 TISSUE EXAM BY PATHOLOGIST: CPT | Mod: 26

## 2020-06-23 PROCEDURE — 44205 LAP COLECTOMY PART W/ILEUM: CPT

## 2020-06-23 RX ORDER — ONDANSETRON 8 MG/1
4 TABLET, FILM COATED ORAL ONCE
Refills: 0 | Status: COMPLETED | OUTPATIENT
Start: 2020-06-23 | End: 2020-06-23

## 2020-06-23 RX ORDER — ACETAMINOPHEN 500 MG
1000 TABLET ORAL ONCE
Refills: 0 | Status: DISCONTINUED | OUTPATIENT
Start: 2020-06-23 | End: 2020-06-23

## 2020-06-23 RX ORDER — ONDANSETRON 8 MG/1
4 TABLET, FILM COATED ORAL EVERY 6 HOURS
Refills: 0 | Status: DISCONTINUED | OUTPATIENT
Start: 2020-06-23 | End: 2020-06-25

## 2020-06-23 RX ORDER — SODIUM CHLORIDE 9 MG/ML
1000 INJECTION, SOLUTION INTRAVENOUS
Refills: 0 | Status: DISCONTINUED | OUTPATIENT
Start: 2020-06-23 | End: 2020-06-24

## 2020-06-23 RX ORDER — ENOXAPARIN SODIUM 100 MG/ML
40 INJECTION SUBCUTANEOUS DAILY
Refills: 0 | Status: DISCONTINUED | OUTPATIENT
Start: 2020-06-24 | End: 2020-06-25

## 2020-06-23 RX ORDER — ACETAMINOPHEN 500 MG
1000 TABLET ORAL EVERY 6 HOURS
Refills: 0 | Status: COMPLETED | OUTPATIENT
Start: 2020-06-23 | End: 2020-06-24

## 2020-06-23 RX ORDER — KETOROLAC TROMETHAMINE 30 MG/ML
30 SYRINGE (ML) INJECTION EVERY 12 HOURS
Refills: 0 | Status: DISCONTINUED | OUTPATIENT
Start: 2020-06-23 | End: 2020-06-23

## 2020-06-23 RX ORDER — METOCLOPRAMIDE HCL 10 MG
10 TABLET ORAL ONCE
Refills: 0 | Status: COMPLETED | OUTPATIENT
Start: 2020-06-23 | End: 2020-06-23

## 2020-06-23 RX ORDER — SODIUM CHLORIDE 9 MG/ML
3 INJECTION INTRAMUSCULAR; INTRAVENOUS; SUBCUTANEOUS EVERY 8 HOURS
Refills: 0 | Status: DISCONTINUED | OUTPATIENT
Start: 2020-06-23 | End: 2020-06-23

## 2020-06-23 RX ORDER — OXYCODONE HYDROCHLORIDE 5 MG/1
5 TABLET ORAL EVERY 4 HOURS
Refills: 0 | Status: DISCONTINUED | OUTPATIENT
Start: 2020-06-23 | End: 2020-06-25

## 2020-06-23 RX ORDER — LIDOCAINE HCL 20 MG/ML
0.2 VIAL (ML) INJECTION ONCE
Refills: 0 | Status: DISCONTINUED | OUTPATIENT
Start: 2020-06-23 | End: 2020-06-23

## 2020-06-23 RX ORDER — CHLORHEXIDINE GLUCONATE 213 G/1000ML
1 SOLUTION TOPICAL ONCE
Refills: 0 | Status: COMPLETED | OUTPATIENT
Start: 2020-06-23 | End: 2020-06-23

## 2020-06-23 RX ORDER — KETOROLAC TROMETHAMINE 30 MG/ML
15 SYRINGE (ML) INJECTION EVERY 6 HOURS
Refills: 0 | Status: DISCONTINUED | OUTPATIENT
Start: 2020-06-23 | End: 2020-06-24

## 2020-06-23 RX ORDER — MERCAPTOPURINE 50 MG/1
25 TABLET ORAL ONCE
Refills: 0 | Status: COMPLETED | OUTPATIENT
Start: 2020-06-24 | End: 2020-06-24

## 2020-06-23 RX ORDER — GABAPENTIN 400 MG/1
600 CAPSULE ORAL ONCE
Refills: 0 | Status: COMPLETED | OUTPATIENT
Start: 2020-06-23 | End: 2020-06-23

## 2020-06-23 RX ORDER — ALLOPURINOL 300 MG
100 TABLET ORAL DAILY
Refills: 0 | Status: DISCONTINUED | OUTPATIENT
Start: 2020-06-23 | End: 2020-06-25

## 2020-06-23 RX ORDER — CELECOXIB 200 MG/1
400 CAPSULE ORAL ONCE
Refills: 0 | Status: COMPLETED | OUTPATIENT
Start: 2020-06-23 | End: 2020-06-23

## 2020-06-23 RX ORDER — LEVOTHYROXINE SODIUM 125 MCG
100 TABLET ORAL DAILY
Refills: 0 | Status: DISCONTINUED | OUTPATIENT
Start: 2020-06-23 | End: 2020-06-25

## 2020-06-23 RX ORDER — OXYCODONE HYDROCHLORIDE 5 MG/1
5 TABLET ORAL EVERY 4 HOURS
Refills: 0 | Status: DISCONTINUED | OUTPATIENT
Start: 2020-06-23 | End: 2020-06-23

## 2020-06-23 RX ADMIN — ONDANSETRON 4 MILLIGRAM(S): 8 TABLET, FILM COATED ORAL at 18:10

## 2020-06-23 RX ADMIN — Medication 400 MILLIGRAM(S): at 18:10

## 2020-06-23 RX ADMIN — SODIUM CHLORIDE 40 MILLILITER(S): 9 INJECTION, SOLUTION INTRAVENOUS at 11:32

## 2020-06-23 RX ADMIN — Medication 15 MILLIGRAM(S): at 12:00

## 2020-06-23 RX ADMIN — SODIUM CHLORIDE 3 MILLILITER(S): 9 INJECTION INTRAMUSCULAR; INTRAVENOUS; SUBCUTANEOUS at 07:53

## 2020-06-23 RX ADMIN — ONDANSETRON 4 MILLIGRAM(S): 8 TABLET, FILM COATED ORAL at 13:15

## 2020-06-23 RX ADMIN — CELECOXIB 400 MILLIGRAM(S): 200 CAPSULE ORAL at 07:52

## 2020-06-23 RX ADMIN — GABAPENTIN 600 MILLIGRAM(S): 400 CAPSULE ORAL at 07:51

## 2020-06-23 RX ADMIN — Medication 400 MILLIGRAM(S): at 23:36

## 2020-06-23 RX ADMIN — Medication 10 MILLIGRAM(S): at 21:29

## 2020-06-23 RX ADMIN — Medication 15 MILLIGRAM(S): at 18:12

## 2020-06-23 RX ADMIN — CHLORHEXIDINE GLUCONATE 1 APPLICATION(S): 213 SOLUTION TOPICAL at 07:42

## 2020-06-23 NOTE — PRE-ANESTHESIA EVALUATION ADULT - NSANTHPMHFT_GEN_ALL_CORE
40F hx of Crohn's disease on humira, 6MP, allopurinol, and hypothyroid. Denies CP or SOB at rest or with activity.

## 2020-06-23 NOTE — PACU DISCHARGE NOTE - NSPTMEETSDISCHCRITERIADT_GEN_A_CORE
Physical Therapy      Patient Name:  Jose Roca   MRN:  [id number]     Attempted to see patient 1005 and 1100 am refused reports he is going home, will follow up as able      Clement Bernal, PT     23-Jun-2020 13:45

## 2020-06-23 NOTE — PROVIDER CONTACT NOTE (OTHER) - ACTION/TREATMENT ORDERED:
Zofran IVSS given with fair relief. Pt vomited 75cc, bilious emesis.   Adriana Winchester aware.  Will continue to monitor Pt status.

## 2020-06-23 NOTE — PROGRESS NOTE ADULT - SUBJECTIVE AND OBJECTIVE BOX
POST OP NOTE    STATUS POST:  Laparoscopic ileocolic resection    POST OPERATIVE DAY #: 0    SUBJECTIVE: Pt seen and examined at bedside. Patient feels well, has no complaints. States she had nausea in PACU that has resolved. Tolerated water and apple juice.  Denies chest pain, SOB, palpitations, HA, fever, chills, emesis.  No ambulation yet. No GI fxn yet.    OBJECTIVE:  Vital Signs Last 24 Hrs  T(C): 36.7 (23 Jun 2020 12:00), Max: 36.8 (23 Jun 2020 07:30)  T(F): 98.1 (23 Jun 2020 12:00), Max: 98.2 (23 Jun 2020 07:30)  HR: 58 (23 Jun 2020 13:15) (58 - 88)  BP: 100/65 (23 Jun 2020 13:15) (99/58 - 137/79)  BP(mean): 74 (23 Jun 2020 12:30) (72 - 85)  RR: 16 (23 Jun 2020 13:15) (16 - 16)  SpO2: 100% (23 Jun 2020 13:15) (97% - 100%)    I&O's Summary    23 Jun 2020 07:01  -  23 Jun 2020 14:27  --------------------------------------------------------  IN: 40 mL / OUT: 225 mL / NET: -185 mL        Physical Exam:  General Appearance: Appears well, NAD, A&O x3  Chest: Equal expansion bilaterally  Abdomen: Soft, NT, ND, dressings c/d/i  Extremities: Grossly symmetric, SCD's in place     LABS:                RADIOLOGY & ADDITIONAL STUDIES:

## 2020-06-23 NOTE — PROGRESS NOTE ADULT - ASSESSMENT
ASSESSMENT/PLAN: 40F pmhx gout, hypothyroidism, and Crohn's (s/p ileocolic resection (6/23)    - dvt ppx  - LR @ 40 cc/ Clears  - Pain control: iv tylenol and toradol standing, prn oxy  - Await GI fxn  - OOB/ambulate  - D/c rios at [facility]  - AM labs  - Caprini - 4      [facility] Surgery  [id number]

## 2020-06-23 NOTE — PRE-OP CHECKLIST - LATEX ALLERGY
[facility]  Discharge Summary  [facility]      Patient Name:  Jonathon Weaver  MRN: [id number]  Admission Date: 2019    Subjective:     Delivery Date: 2019   Delivery Time: 11:04 AM   Delivery Type: , Low Transverse     Maternal History:   Jonathon Weaver is a 3 days day old 39w0d   born to a mother who is a 20 y.o.   . She has a past medical history of Iron deficiency anemia during pregnancy (2019) and Iron deficiency anemia during pregnancy (2019). .     Prenatal Labs Review:  ABO/Rh:   Lab Results   Component Value Date/Time    GROUPTRH A POS 2019 05:58 AM     Group B Beta Strep:   Lab Results   Component Value Date/Time    STREPBCULT No Group B Streptococcus isolated 2019 01:12 PM     HIV: 2019: HIV 1/2 Ag/Ab Negative (Ref range: Negative)    RPR:   Lab Results   Component Value Date/Time    RPR Non-reactive 2019 10:26 AM     Hepatitis B Surface Antigen:   Lab Results   Component Value Date/Time    HEPBSAG Negative 2018 09:21 AM     Rubella Immune Status:   Lab Results   Component Value Date/Time    RUBELLAIMMUN Reactive 2018 09:21 AM       Pregnancy/Delivery Course (synopsis of major diagnoses, care, treatment, and services provided during the course of the hospital stay):    The pregnancy was uncomplicated. Prenatal ultrasound revealed normal anatomy. Prenatal care was good. Mother received no medications. Membranes ruptured at delivery. The delivery was complicated by kiwi assist with pop offs x 2. Apgar scores   [place] Assessment:     1 Minute:   Skin color:     Muscle tone:     Heart rate:     Breathing:     Grimace:     Total:  9          5 Minute:   Skin color:     Muscle tone:     Heart rate:     Breathing:     Grimace:     Total:  9          10 Minute:   Skin color:     Muscle tone:     Heart rate:     Breathing:     Grimace:     Total:           Living Status:       .    Review of Systems    Objective:  "    Admission GA: 39w0d   Admission Weight: 3161 g (6 lb 15.5 oz)(Filed from Delivery Summary)  Admission  Head Circumference: 34 cm (13.39")   Admission Length: Height: 50.8 cm (20")    Delivery Method: , Low Transverse       Feeding Method: Similac ADV nippling ad navjot and tolerating well (84 ml/kg last 24 hrs)    Labs:  Recent Results (from the past 168 hour(s))   Cord blood evaluation    Collection Time: 19 11:04 AM   Result Value Ref Range    Cord ABO O     Cord Rh POS     Cord Direct Kavitha NEG    Bilirubin, Total,     Collection Time: 19 11:14 AM   Result Value Ref Range    Bilirubin, Total -  5.3 0.1 - 6.0 mg/dL       Immunization History   Administered Date(s) Administered    Hepatitis B, Pediatric/Adolescent 2019       Nursery Course (synopsis of major diagnoses, care, treatment, and services provided during the course of the hospital stay): unremarkable, infant clinically stable at time of discharge     Screen sent greater than 24 hours?: yes  Hearing Screen Right Ear: passed    Left Ear: passed   Stooling: Yes  Voiding: Yes  SpO2: Pre-Ductal (Right Hand): 100 %  SpO2: Post-Ductal: 100 %  Car Seat Test?    Therapeutic Interventions: none  Surgical Procedures: circumcision    Discharge Exam:   Discharge Weight: Weight: 3043 g (6 lb 11.3 oz)  Weight Change Since Birth: -4%     Physical Exam   Physical Exam:   General Appearance:  Healthy-appearing, vigorous male infant, no dysmorphic features, supine in crib  Head:  Normocephalic, atraumatic, anterior fontanelle open soft and flat  Eyes:  PERRL, red reflex present bilaterally, anicteric sclera, no discharge  Ears:  Well-positioned, well-formed pinnae                             Nose:  nares patent, no rhinorrhea  Throat:  oropharynx clear, non-erythematous, mucous membranes moist, palate intact  Neck:  Supple, symmetrical, no torticollis  Chest:  Lungs clear to auscultation, respirations unlabored, chest " symmetrical   Heart:  Regular rate & rhythm, normal S1/S2, no murmurs, rubs, or gallops                     Abdomen:  positive bowel sounds, soft, non-tender, non-distended, no masses, umbilical stump clean, minimal bloody drainage noted, cleaned with alcohol prep, drying  Pulses:  Strong equal femoral and brachial pulses, brisk capillary refill  Hips:  Negative Mondragon & Ortolani, gluteal creases equal  :  Normal Jose I male genitalia, anus patent, testes descended, circumcised today  Musculosketal: no bell or dimples, no scoliosis or masses, clavicles intact  Extremities:  Well-perfused, warm and dry, no cyanosis  Skin: no rashes, minimal jaundice, intact, Mauritanian spots to buttocks  Neuro:  strong cry, good symmetric tone and strength; positive tank, root and suck    Assessment and Plan:     Discharge Date and Time: today    Final Diagnoses:   Final Active Diagnoses:    Diagnosis Date Noted POA    PRINCIPAL PROBLEM:  Single liveborn, born in hospital, delivered by  delivery [Z38.01] 2019 Yes    Male circumcision [Z41.2] 2019 Not Applicable    Single liveborn infant [Z38.2] 2019 Yes      Problems Resolved During this Admission:       Discharged Condition: Good    Disposition: Discharge to Home    Follow Up:  Follow-up Information     Dinesh Pike MD In 1 week.    Specialty:  Pediatrics  Why:  new born follow up  Contact information:  [address]  [place] [address] [phone]                 Patient Instructions:   No discharge procedures on file.  Medications:  Reconciled Home Medications: There are no discharge medications for this patient.      Special Instructions: clean umbilical cord daily with alcohol PRN    NIALL Minor  Pediatrics  [facility]     no

## 2020-06-23 NOTE — BRIEF OPERATIVE NOTE - OPERATION/FINDINGS
mild disease of the proximal ascending and distal TI (w/ dilated distal TI), focal stenosis at the IC valve, uterus adherent to the lower portion of the midline

## 2020-06-24 LAB
ANION GAP SERPL CALC-SCNC: 11 MMOL/L — SIGNIFICANT CHANGE UP (ref 5–17)
BASOPHILS # BLD AUTO: 0.02 K/UL — SIGNIFICANT CHANGE UP (ref 0–0.2)
BASOPHILS NFR BLD AUTO: 0.2 % — SIGNIFICANT CHANGE UP (ref 0–2)
BUN SERPL-MCNC: 7 MG/DL — SIGNIFICANT CHANGE UP (ref 7–23)
CALCIUM SERPL-MCNC: 8.4 MG/DL — SIGNIFICANT CHANGE UP (ref 8.4–10.5)
CHLORIDE SERPL-SCNC: 103 MMOL/L — SIGNIFICANT CHANGE UP (ref 96–108)
CO2 SERPL-SCNC: 22 MMOL/L — SIGNIFICANT CHANGE UP (ref 22–31)
CREAT SERPL-MCNC: 0.67 MG/DL — SIGNIFICANT CHANGE UP (ref 0.5–1.3)
EOSINOPHIL # BLD AUTO: 0.01 K/UL — SIGNIFICANT CHANGE UP (ref 0–0.5)
EOSINOPHIL NFR BLD AUTO: 0.1 % — SIGNIFICANT CHANGE UP (ref 0–6)
GLUCOSE SERPL-MCNC: 81 MG/DL — SIGNIFICANT CHANGE UP (ref 70–99)
HCT VFR BLD CALC: 33.7 % — LOW (ref 34.5–45)
HGB BLD-MCNC: 11.2 G/DL — LOW (ref 11.5–15.5)
IMM GRANULOCYTES NFR BLD AUTO: 0.8 % — SIGNIFICANT CHANGE UP (ref 0–1.5)
LYMPHOCYTES # BLD AUTO: 1.29 K/UL — SIGNIFICANT CHANGE UP (ref 1–3.3)
LYMPHOCYTES # BLD AUTO: 13 % — SIGNIFICANT CHANGE UP (ref 13–44)
MAGNESIUM SERPL-MCNC: 2.3 MG/DL — SIGNIFICANT CHANGE UP (ref 1.6–2.6)
MCHC RBC-ENTMCNC: 31.8 PG — SIGNIFICANT CHANGE UP (ref 27–34)
MCHC RBC-ENTMCNC: 33.2 GM/DL — SIGNIFICANT CHANGE UP (ref 32–36)
MCV RBC AUTO: 95.7 FL — SIGNIFICANT CHANGE UP (ref 80–100)
MONOCYTES # BLD AUTO: 0.75 K/UL — SIGNIFICANT CHANGE UP (ref 0–0.9)
MONOCYTES NFR BLD AUTO: 7.6 % — SIGNIFICANT CHANGE UP (ref 2–14)
NEUTROPHILS # BLD AUTO: 7.74 K/UL — HIGH (ref 1.8–7.4)
NEUTROPHILS NFR BLD AUTO: 78.3 % — HIGH (ref 43–77)
NRBC # BLD: 0 /100 WBCS — SIGNIFICANT CHANGE UP (ref 0–0)
PHOSPHATE SERPL-MCNC: 2.8 MG/DL — SIGNIFICANT CHANGE UP (ref 2.5–4.5)
PLATELET # BLD AUTO: SIGNIFICANT CHANGE UP K/UL (ref 150–400)
POTASSIUM SERPL-MCNC: 3.6 MMOL/L — SIGNIFICANT CHANGE UP (ref 3.5–5.3)
POTASSIUM SERPL-SCNC: 3.6 MMOL/L — SIGNIFICANT CHANGE UP (ref 3.5–5.3)
RBC # BLD: 3.52 M/UL — LOW (ref 3.8–5.2)
RBC # FLD: 13.6 % — SIGNIFICANT CHANGE UP (ref 10.3–14.5)
SODIUM SERPL-SCNC: 136 MMOL/L — SIGNIFICANT CHANGE UP (ref 135–145)
WBC # BLD: 9.89 K/UL — SIGNIFICANT CHANGE UP (ref 3.8–10.5)
WBC # FLD AUTO: 9.89 K/UL — SIGNIFICANT CHANGE UP (ref 3.8–10.5)

## 2020-06-24 RX ORDER — MAGNESIUM OXIDE 400 MG ORAL TABLET 241.3 MG
1000 TABLET ORAL
Refills: 0 | Status: DISCONTINUED | OUTPATIENT
Start: 2020-06-24 | End: 2020-06-24

## 2020-06-24 RX ORDER — SODIUM,POTASSIUM PHOSPHATES 278-250MG
1 POWDER IN PACKET (EA) ORAL ONCE
Refills: 0 | Status: COMPLETED | OUTPATIENT
Start: 2020-06-24 | End: 2020-06-24

## 2020-06-24 RX ORDER — ACETAMINOPHEN 500 MG
1000 TABLET ORAL EVERY 6 HOURS
Refills: 0 | Status: DISCONTINUED | OUTPATIENT
Start: 2020-06-24 | End: 2020-06-25

## 2020-06-24 RX ADMIN — ENOXAPARIN SODIUM 40 MILLIGRAM(S): 100 INJECTION SUBCUTANEOUS at 11:26

## 2020-06-24 RX ADMIN — Medication 400 MILLIGRAM(S): at 05:30

## 2020-06-24 RX ADMIN — Medication 1 PACKET(S): at 11:25

## 2020-06-24 RX ADMIN — Medication 15 MILLIGRAM(S): at 01:11

## 2020-06-24 RX ADMIN — Medication 1000 MILLIGRAM(S): at 17:21

## 2020-06-24 RX ADMIN — Medication 1000 MILLIGRAM(S): at 22:48

## 2020-06-24 RX ADMIN — Medication 1000 MILLIGRAM(S): at 11:26

## 2020-06-24 RX ADMIN — Medication 100 MICROGRAM(S): at 05:30

## 2020-06-24 NOTE — DIETITIAN INITIAL EVALUATION ADULT. - OTHER INFO
Pertinent Information: This is a 40F pmhx gout, hypothyroidism, and Crohn's (s/p ileocolic resection (6/23)    Pt reports good PO intake and appetite, reports she has been eating a little more than usual due to working from home. Has been following a low fiber diet for the last 5 yeas with an occasional salad. Confirms NKFA. Pt denies chewing/swallowing difficulty, nausea, vomiting, diarrhea, constipation.  Takes multivitamin and biotin supplement.     Pt reports UBW as ~ 120lbs, endorses some weight gain over the last few months. Weight per previous RD was 116.6lbs (12/11/19), current dosing weight is 123.8lbs (6/23/20).     Pt tolerating clear liquid diet, no nausea, emesis noted. No flatus/BM. Pt familiar with low fiber diet however receptive to review. Provided low-fiber nutrition therapy including importance of avoiding  fiber rich foods, fresh fruits/vegetables, whole grains, and added fiber in processed foods. Discussed chewing foods well and adequate hydration. Pt verbalized understanding and accepted written handout. Patient with no nutrition-related questions at this time. Made aware RD remains available as needed.

## 2020-06-24 NOTE — PROGRESS NOTE ADULT - SUBJECTIVE AND OBJECTIVE BOX
COLORECTAL SURGERY DAILY PROGRESS NOTE      SUBJECTIVE: Pt seen and examined at bedside s/p Lap ileocolic resection. Yesterday, had multiple episodes of emesis and dizziness, zofran and reglan helped.  This morning patient feels a lot better and has no complaints.   Denies chest pain, SOB, palpitations, HA, fever, chills, emesis.  No ambulation yet. No GI fxn yet. Alexi green at [facility].    OBJECTIVE:  Vital Signs Last 24 Hrs  T(C): 37.2 (24 Jun 2020 05:29), Max: 37.2 (24 Jun 2020 05:29)  T(F): 99 (24 Jun 2020 05:29), Max: 99 (24 Jun 2020 05:29)  HR: 69 (24 Jun 2020 05:29) (58 - 88)  BP: 104/67 (24 Jun 2020 05:29) (99/58 - 129/82)  BP(mean): 74 (23 Jun 2020 12:30) (72 - 85)  RR: 18 (24 Jun 2020 05:29) (16 - 18)  SpO2: 97% (24 Jun 2020 05:29) (97% - 100%)    I&O's Detail    23 Jun 2020 07:01  -  24 Jun 2020 07:00  --------------------------------------------------------  IN:    IV PiggyBack: 200 mL    lactated ringers.: 720 mL    Oral Fluid: 150 mL  Total IN: 1070 mL    OUT:    Emesis: 250 mL    Indwelling Catheter - Urethral: 1150 mL  Total OUT: 1400 mL    Total NET: -330 mL            Physical Exam:  General Appearance: Appears well, NAD, A&O x3  Chest: Equal expansion bilaterally  Abdomen: Soft, NT, ND, dressings c/d/i  Extremities: Grossly symmetric, SCD's in place     LABS:                          11.2   9.89  )-----------( Clumped    ( 24 Jun 2020 07:08 )             33.7     06-24    136  |  103  |  7   ----------------------------<  81  3.6   |  22  |  0.67    Ca    8.4      24 Jun 2020 07:08  Phos  2.8     06-24  Mg     2.3     06-24                RADIOLOGY & ADDITIONAL STUDIES:

## 2020-06-24 NOTE — DIETITIAN INITIAL EVALUATION ADULT. - PHYSICAL APPEARANCE
other (specify)/well nourished Ht: 64.5, Wt: 123.8lbs, BMI: 20.9kg/m2, IBW: 122.5lbs +/- 10%, %IBW: 101%  Edema: none , Skin: free of pressure injuries per nursing flow sheets

## 2020-06-24 NOTE — PROGRESS NOTE ADULT - SUBJECTIVE AND OBJECTIVE BOX
Day 1 of Anesthesia Pain Management Service    SUBJECTIVE: DOing okay  Pain Scale Score:          [X] Refer to charted pain scores    THERAPY:    s/p    200 mcg PF morphine on 6/23/2020      MEDICATIONS  (STANDING):  acetaminophen   Tablet .. 1000 milliGRAM(s) Oral every 6 hours  allopurinol 100 milliGRAM(s) Oral daily  enoxaparin Injectable 40 milliGRAM(s) SubCutaneous daily  lactated ringers. 1000 milliLiter(s) (40 mL/Hr) IV Continuous <Continuous>  levothyroxine 100 MICROGram(s) Oral daily  mercaptopurine 25 milliGRAM(s) Oral once    MEDICATIONS  (PRN):  ondansetron Injectable 4 milliGRAM(s) IV Push every 6 hours PRN Nausea  oxyCODONE    IR 5 milliGRAM(s) Oral every 4 hours PRN Moderate Pain (4 - 6)      OBJECTIVE:    Sedation:        	[X] Alert	 [ ] Drowsy	[ ] Arousable      [ ] Asleep       [ ] Unresponsive    Side Effects:	[X] None 	[ ] Nausea	[ ] Vomiting         [ ] Pruritus  		[ ] Weakness            [ ] Numbness	          [ ] Other:    Vital Signs Last 24 Hrs  T(C): 37.2 (24 Jun 2020 08:23), Max: 37.2 (24 Jun 2020 05:29)  T(F): 99 (24 Jun 2020 08:23), Max: 99 (24 Jun 2020 05:29)  HR: 69 (24 Jun 2020 05:29) (58 - 88)  BP: 104/67 (24 Jun 2020 05:29) (99/58 - 129/82)  BP(mean): 74 (23 Jun 2020 12:30) (72 - 85)  RR: 18 (24 Jun 2020 08:23) (16 - 18)  SpO2: 98% (24 Jun 2020 08:23) (97% - 100%)    ASSESSMENT/ PLAN  [X] Patient transitioned to prn analgesics  [X] Pain management per primary service, pain service to sign off   [X]Documentation and Verification of current medications

## 2020-06-24 NOTE — DIETITIAN INITIAL EVALUATION ADULT. - ADD RECOMMEND
1) Medical team to advance diet when medically feasible. Consider advancing to low fiber diet as tolerated. 2) Diet education provided, reinforce as needed.

## 2020-06-25 ENCOUNTER — TRANSCRIPTION ENCOUNTER (OUTPATIENT)
Age: 40
End: 2020-06-25

## 2020-06-25 VITALS
TEMPERATURE: 98 F | DIASTOLIC BLOOD PRESSURE: 74 MMHG | RESPIRATION RATE: 18 BRPM | HEART RATE: 80 BPM | SYSTOLIC BLOOD PRESSURE: 105 MMHG | OXYGEN SATURATION: 97 %

## 2020-06-25 LAB
ANION GAP SERPL CALC-SCNC: 10 MMOL/L — SIGNIFICANT CHANGE UP (ref 5–17)
BUN SERPL-MCNC: 7 MG/DL — SIGNIFICANT CHANGE UP (ref 7–23)
CALCIUM SERPL-MCNC: 8.5 MG/DL — SIGNIFICANT CHANGE UP (ref 8.4–10.5)
CHLORIDE SERPL-SCNC: 105 MMOL/L — SIGNIFICANT CHANGE UP (ref 96–108)
CO2 SERPL-SCNC: 23 MMOL/L — SIGNIFICANT CHANGE UP (ref 22–31)
CREAT SERPL-MCNC: 0.66 MG/DL — SIGNIFICANT CHANGE UP (ref 0.5–1.3)
GLUCOSE SERPL-MCNC: 89 MG/DL — SIGNIFICANT CHANGE UP (ref 70–99)
HCT VFR BLD CALC: 32.4 % — LOW (ref 34.5–45)
HGB BLD-MCNC: 10.6 G/DL — LOW (ref 11.5–15.5)
MAGNESIUM SERPL-MCNC: 2 MG/DL — SIGNIFICANT CHANGE UP (ref 1.6–2.6)
MCHC RBC-ENTMCNC: 31.4 PG — SIGNIFICANT CHANGE UP (ref 27–34)
MCHC RBC-ENTMCNC: 32.7 GM/DL — SIGNIFICANT CHANGE UP (ref 32–36)
MCV RBC AUTO: 95.9 FL — SIGNIFICANT CHANGE UP (ref 80–100)
NRBC # BLD: 0 /100 WBCS — SIGNIFICANT CHANGE UP (ref 0–0)
PHOSPHATE SERPL-MCNC: 2.4 MG/DL — LOW (ref 2.5–4.5)
PLATELET # BLD AUTO: SIGNIFICANT CHANGE UP (ref 150–400)
POTASSIUM SERPL-MCNC: 3.6 MMOL/L — SIGNIFICANT CHANGE UP (ref 3.5–5.3)
POTASSIUM SERPL-SCNC: 3.6 MMOL/L — SIGNIFICANT CHANGE UP (ref 3.5–5.3)
RBC # BLD: 3.38 M/UL — LOW (ref 3.8–5.2)
RBC # FLD: 13.8 % — SIGNIFICANT CHANGE UP (ref 10.3–14.5)
SODIUM SERPL-SCNC: 138 MMOL/L — SIGNIFICANT CHANGE UP (ref 135–145)
WBC # BLD: 9.05 K/UL — SIGNIFICANT CHANGE UP (ref 3.8–10.5)
WBC # FLD AUTO: 9.05 K/UL — SIGNIFICANT CHANGE UP (ref 3.8–10.5)

## 2020-06-25 PROCEDURE — 80048 BASIC METABOLIC PNL TOTAL CA: CPT

## 2020-06-25 PROCEDURE — 85027 COMPLETE CBC AUTOMATED: CPT

## 2020-06-25 PROCEDURE — 88309 TISSUE EXAM BY PATHOLOGIST: CPT

## 2020-06-25 PROCEDURE — 84100 ASSAY OF PHOSPHORUS: CPT

## 2020-06-25 PROCEDURE — 82962 GLUCOSE BLOOD TEST: CPT

## 2020-06-25 PROCEDURE — C1889: CPT

## 2020-06-25 PROCEDURE — 83735 ASSAY OF MAGNESIUM: CPT

## 2020-06-25 RX ORDER — ALLOPURINOL 300 MG
1 TABLET ORAL
Qty: 0 | Refills: 0 | DISCHARGE

## 2020-06-25 RX ORDER — ACETAMINOPHEN 500 MG
2 TABLET ORAL
Qty: 0 | Refills: 0 | DISCHARGE
Start: 2020-06-25

## 2020-06-25 RX ORDER — KETOROLAC TROMETHAMINE 30 MG/ML
1 SYRINGE (ML) INJECTION
Qty: 6 | Refills: 0
Start: 2020-06-25 | End: 2020-06-26

## 2020-06-25 RX ORDER — ADALIMUMAB 40MG/0.8ML
1 KIT SUBCUTANEOUS
Qty: 0 | Refills: 0 | DISCHARGE

## 2020-06-25 RX ORDER — MERCAPTOPURINE 50 MG/1
0.5 TABLET ORAL
Qty: 0 | Refills: 0 | DISCHARGE

## 2020-06-25 RX ORDER — SODIUM,POTASSIUM PHOSPHATES 278-250MG
2 POWDER IN PACKET (EA) ORAL ONCE
Refills: 0 | Status: COMPLETED | OUTPATIENT
Start: 2020-06-25 | End: 2020-06-25

## 2020-06-25 RX ORDER — KETOROLAC TROMETHAMINE 30 MG/ML
10 SYRINGE (ML) INJECTION EVERY 6 HOURS
Refills: 0 | Status: DISCONTINUED | OUTPATIENT
Start: 2020-06-25 | End: 2020-06-25

## 2020-06-25 RX ORDER — OXYCODONE HYDROCHLORIDE 5 MG/1
1 TABLET ORAL
Qty: 18 | Refills: 0
Start: 2020-06-25 | End: 2020-06-27

## 2020-06-25 RX ADMIN — Medication 10 MILLIGRAM(S): at 12:46

## 2020-06-25 RX ADMIN — Medication 100 MICROGRAM(S): at 05:17

## 2020-06-25 RX ADMIN — Medication 2 TABLET(S): at 08:25

## 2020-06-25 RX ADMIN — OXYCODONE HYDROCHLORIDE 5 MILLIGRAM(S): 5 TABLET ORAL at 08:25

## 2020-06-25 RX ADMIN — Medication 1000 MILLIGRAM(S): at 05:17

## 2020-06-25 RX ADMIN — OXYCODONE HYDROCHLORIDE 5 MILLIGRAM(S): 5 TABLET ORAL at 03:11

## 2020-06-25 RX ADMIN — ENOXAPARIN SODIUM 40 MILLIGRAM(S): 100 INJECTION SUBCUTANEOUS at 11:23

## 2020-06-25 NOTE — DISCHARGE NOTE NURSING/CASE MANAGEMENT/SOCIAL WORK - PATIENT PORTAL LINK FT
You can access the FollowMyHealth Patient Portal offered by [facility] by registering at the following website: [email or web address]. By joining [facility]’s FollowMyHealth portal, you will also be able to view your health information using other applications (apps) compatible with our system.

## 2020-06-25 NOTE — DISCHARGE NOTE PROVIDER - CARE PROVIDERS DIRECT ADDRESSES
[email or web address]

## 2020-06-25 NOTE — PROGRESS NOTE ADULT - ASSESSMENT
39 yo old female POD#2 ileocecectomy   -PO Toradol for 2 days - then PO Motrin   -OOB ambulate  -d/c home     red Sx [id number]

## 2020-06-25 NOTE — PROGRESS NOTE ADULT - SUBJECTIVE AND OBJECTIVE BOX
Red surgery Progress Note     Subjective: complaining of abdominal pain - asking for toradol, no n/v, tolerating diet,  passing gas     PE: NAD AAOX3  abd soft appropriate incisional tenderness, incision c/d/i     Vital Signs Last 24 Hrs  T(C): 36.9 (2020 05:51), Max: 37.3 (2020 21:20)  T(F): 98.5 (2020 05:51), Max: 99.2 (2020 21:20)  HR: 75 (2020 05:51) (70 - 93)  BP: 103/64 (2020 05:51) (101/66 - 118/76)  BP(mean): --  RR: 18 (2020 05:51) (18 - 18)  SpO2: 98% (2020 05:51) (96% - 100%)    I&O's Detail    2020 07:01  -  2020 07:00  --------------------------------------------------------  IN:    lactated ringers.: 400 mL    Oral Fluid: 730 mL  Total IN: 1130 mL    OUT:    Estimated Blood Loss: 1 mL    Voided: 2150 mL  Total OUT: 2151 mL    Total NET: -1021 mL          Daily     Daily Weight in k.1 (2020 09:45)    MEDICATIONS  (STANDING):  acetaminophen   Tablet .. 1000 milliGRAM(s) Oral every 6 hours  allopurinol 100 milliGRAM(s) Oral daily  enoxaparin Injectable 40 milliGRAM(s) SubCutaneous daily  ketorolac 10 milliGRAM(s) Oral every 6 hours  levothyroxine 100 MICROGram(s) Oral daily    MEDICATIONS  (PRN):  ondansetron Injectable 4 milliGRAM(s) IV Push every 6 hours PRN Nausea  oxyCODONE    IR 5 milliGRAM(s) Oral every 4 hours PRN Moderate Pain (4 - 6)      LABS:                        10.6   9.05  )-----------( x        ( 2020 06:42 )             32.4     06-25    138  |  105  |  7   ----------------------------<  89  3.6   |  23  |  0.66    Ca    8.5      2020 06:42  Phos  2.4       Mg     2.0

## 2020-06-25 NOTE — DISCHARGE NOTE PROVIDER - NSDCFUSCHEDAPPT_GEN_ALL_CORE_FT
MIHAI MUNIZ ; 07/01/2020 ; NPP Surg [place] [address] [facility]  MIHAI MUNIZ ; 09/10/2020 ; NPP [facility] [address]  MIHAI MUNIZ ; 07/01/2020 ; NPP Surg [place] [address] [facility]  MIHAI MUNIZ ; 09/10/2020 ; NPP Card[facility] [address]

## 2020-06-25 NOTE — DISCHARGE NOTE PROVIDER - NSDCMRMEDTOKEN_GEN_ALL_CORE_FT
acetaminophen 500 mg oral tablet: 2 tab(s) orally every 6 hours  allopurinol 100 mg oral tablet: 1 tab(s) orally once a day, skipping Saturdays  ketorolac 10 mg oral tablet: 1 tab(s) orally every 6 hours MDD:4  Loestrin 21 1.5/30 oral tablet: 1 tab(s) orally once a day    Note: Pt has home OCP with them  oxyCODONE 5 mg oral tablet: 1 tab(s) orally every 4 hours, As needed, Moderate Pain (4 - 6) MDD:6  Synthroid 100 mcg (0.1 mg) oral tablet: 1 tab(s) orally once a day acetaminophen 500 mg oral tablet: 2 tab(s) orally every 6 hours  ketorolac 10 mg oral tablet: 1 tab(s) orally every 6 hours MDD:4  Loestrin 21 1.5/30 oral tablet: 1 tab(s) orally once a day    Note: Pt has home OCP with them  oxyCODONE 5 mg oral tablet: 1 tab(s) orally every 4 hours, As needed, Moderate Pain (4 - 6) MDD:6  Synthroid 100 mcg (0.1 mg) oral tablet: 1 tab(s) orally once a day

## 2020-06-25 NOTE — DISCHARGE NOTE PROVIDER - NSDCCPCAREPLAN_GEN_ALL_CORE_FT
PRINCIPAL DISCHARGE DIAGNOSIS  Diagnosis: Crohn's disease  Assessment and Plan of Treatment: post op care   low residue diet as tolerated- avoid raw fruits and vegetables   You may shower, remove dressing prior to showering. Let soap and water run over incision, pat dry after and replace dry gauze with paper tape   take tylenol 1000mg every 6 hours and toradol 10mg every 6 hours for 2 days after complete toradol can take motrin 400mg every 6 hours   oxycodone 5mg as needed for severe pain not relvied by tylenol or motrin   follow up with Dr. Sofia in 1 week   follow up with GI Dr. Kelly in 1 week - f/u with Dr. Kelly before resuming your crohns meds Humira and mercaptpurine   notify Dr. Sofia if develop fever, chills, worsening abdominal pain, nausea, vomiting PRINCIPAL DISCHARGE DIAGNOSIS  Diagnosis: Crohn's disease  Assessment and Plan of Treatment: post op care   low residue diet as tolerated- avoid raw fruits and vegetables   You may shower, remove dressing prior to showering. Let soap and water run over incision, pat dry after and replace dry gauze with paper tape   take tylenol 1000mg every 6 hours and toradol 10mg every 6 hours for 2 days after complete toradol can take motrin 400mg every 6 hours   oxycodone 5mg as needed for severe pain not relvied by tylenol or motrin   follow up with Dr. Sofia in 1 week   follow up with GI Dr. Kelly in 1 week - f/u with Dr. Kelly before resuming your crohns meds Humira and mercaptpurine or your gout medication allopurinol   notify Dr. Sofia if develop fever, chills, worsening abdominal pain, nausea, vomiting

## 2020-06-25 NOTE — DISCHARGE NOTE PROVIDER - HOSPITAL COURSE
41 yo female with history of crohns. Patient underwent Laparoscopic ileocolic resection.     Post op pt started no clears advanced to LRD. Pt discharged home with GI fx, Pt ambulating, voiding, tolerating diet on discharge.

## 2020-06-25 NOTE — DISCHARGE NOTE PROVIDER - PROVIDER TOKENS
PROVIDER:[TOKEN:[[id number]],FOLLOWUP:[1 week]],PROVIDER:[TOKEN:[[id number]],FOLLOWUP:[1 week]] rubella/HIV/blood type/VDRL/RPR/HBsAG

## 2020-06-25 NOTE — DISCHARGE NOTE PROVIDER - CARE PROVIDER_API CALL
Johnson Sofia  COLON/RECTAL SURGERY  [address] [place] [address]  Phone: [phone]  Fax: [phone]  Follow Up Time: 1 week    Edmundo Kelly  GASTROENTEROLOGY  [address] [place] [id number]  Phone: [phone]  Fax: [phone]  Follow Up Time: 1 week

## 2020-06-26 LAB — SURGICAL PATHOLOGY STUDY: SIGNIFICANT CHANGE UP

## 2020-07-01 ENCOUNTER — APPOINTMENT (OUTPATIENT)
Dept: COLORECTAL SURGERY | Facility: CLINIC | Age: 40
End: 2020-07-01
Payer: COMMERCIAL

## 2020-07-01 VITALS — HEART RATE: 73 BPM | TEMPERATURE: 99 F | DIASTOLIC BLOOD PRESSURE: 75 MMHG | SYSTOLIC BLOOD PRESSURE: 116 MMHG

## 2020-07-01 PROCEDURE — 99024 POSTOP FOLLOW-UP VISIT: CPT

## 2020-07-01 RX ORDER — METRONIDAZOLE 500 MG/1
500 TABLET ORAL
Qty: 3 | Refills: 0 | Status: DISCONTINUED | COMMUNITY
Start: 2020-03-10 | End: 2020-07-01

## 2020-07-01 RX ORDER — MERCAPTOPURINE 50 MG/1
50 TABLET ORAL
Refills: 0 | Status: DISCONTINUED | COMMUNITY
Start: 2016-12-12 | End: 2020-07-01

## 2020-07-01 RX ORDER — NEOMYCIN SULFATE 500 MG/1
500 TABLET ORAL
Qty: 6 | Refills: 0 | Status: DISCONTINUED | COMMUNITY
Start: 2020-03-10 | End: 2020-07-01

## 2020-07-01 RX ORDER — ALLOPURINOL 100 MG/1
100 TABLET ORAL DAILY
Qty: 30 | Refills: 1 | Status: DISCONTINUED | COMMUNITY
Start: 2020-04-27 | End: 2020-07-01

## 2020-07-01 RX ORDER — FLUTICASONE PROPIONATE 50 MCG
SPRAY, SUSPENSION NASAL
Refills: 0 | Status: DISCONTINUED | COMMUNITY
End: 2020-07-01

## 2020-07-02 ENCOUNTER — APPOINTMENT (OUTPATIENT)
Dept: GASTROENTEROLOGY | Facility: CLINIC | Age: 40
End: 2020-07-02
Payer: COMMERCIAL

## 2020-07-02 VITALS
WEIGHT: 120.38 LBS | HEART RATE: 76 BPM | BODY MASS INDEX: 20.55 KG/M2 | TEMPERATURE: 98.5 F | DIASTOLIC BLOOD PRESSURE: 60 MMHG | HEIGHT: 64 IN | OXYGEN SATURATION: 99 % | SYSTOLIC BLOOD PRESSURE: 106 MMHG

## 2020-07-02 PROCEDURE — 99214 OFFICE O/P EST MOD 30 MIN: CPT

## 2020-07-02 NOTE — HISTORY OF PRESENT ILLNESS
[de-identified] : dictation inactive\par in office visit\par \par 40 yr female, stricturing CD , IC resection last week\par Doing VERY well\par Cleared to resume therapy with humira

## 2020-07-02 NOTE — PHYSICAL EXAM
[General Appearance - In No Acute Distress] : in no acute distress [General Appearance - Alert] : alert [Extraocular Movements] : extraocular movements were intact [PERRL With Normal Accommodation] : pupils were equal in size, round, and reactive to light [Sclera] : the sclera and conjunctiva were normal [Impaired Insight] : insight and judgment were intact [Oriented To Time, Place, And Person] : oriented to person, place, and time [Affect] : the affect was normal [FreeTextEntry1] : healing scars

## 2020-07-02 NOTE — ASSESSMENT
[FreeTextEntry1] : Post op CD\par \par Rec\par Risks benefits alternatives to resuming humira for post operative disease prophylaxis\par patient agrees\par medication ordered\par ov 3 months\par

## 2020-07-02 NOTE — HISTORY OF PRESENT ILLNESS
[FreeTextEntry1] : Pleasant 40-year-old white female who presents for her first postoperative visit. Patient is approximately 8-10 days status post laparoscopic ileocolic resection for Crohn's disease. The patient had a rapid and uneventful postoperative recuperation and was discharged home on postoperative day #2.\par \par The patient looks and feels quite well. She is requiring no pain medication. She resumed working remotely from home. She is tolerating diet and having formed bowel movements. She denies rectal bleeding or temperature elevation.\par \par Final pathology revealed no evidence of active Crohn's disease.\par \par Physical examination reveals a healthy-appearing young female. Her abdomen is totally soft, nontender and nondistended. Her trocar sites and specimen extraction site in the midline are well-healed. Staples were removed from the specimen extraction site. There is no evidence of wound infection.\par \par The patient was asked to remain on a low-residue diet for now. She may resume driving. She will follow- up with her gastroenterologist Dr. Kelly. I will see her in one month.

## 2020-07-11 NOTE — DIETITIAN INITIAL EVALUATION ADULT. - PROBLEM SELECTOR PROBLEM 2
ETOH abuse Crohn's disease of both small and large intestine with intestinal obstruction Alcohol withdrawal syndrome, with delirium

## 2020-08-03 ENCOUNTER — APPOINTMENT (OUTPATIENT)
Dept: COLORECTAL SURGERY | Facility: CLINIC | Age: 40
End: 2020-08-03
Payer: COMMERCIAL

## 2020-08-03 PROCEDURE — 99024 POSTOP FOLLOW-UP VISIT: CPT

## 2020-08-04 NOTE — HISTORY OF PRESENT ILLNESS
[FreeTextEntry1] : Pleasant 40-year-old female who presents for her second postoperative visit. She is approximately 6 weeks status post laparoscopic ileocolic resection for ileal Crohn's disease.\par \par She has no significant incisional pain but continues to have minor "twinges" in the right lower quadrant. She also complains of joint discomfort in her hands and feet. She is back on Humira.\par \par Physical examination reveals a thin white female. Her abdomen is flat and totally nontender. Her specimen extraction site and trocar sites are fully healed.\par \par She has no further dietary or physical activity restrictions. I want her to continue to follow-up with her gastroenterologist and see a rheumatologist again for her arthritic issues. I will see her as needed.

## 2020-09-17 ENCOUNTER — APPOINTMENT (OUTPATIENT)
Dept: CARDIOLOGY | Facility: CLINIC | Age: 40
End: 2020-09-17
Payer: COMMERCIAL

## 2020-09-17 VITALS
DIASTOLIC BLOOD PRESSURE: 76 MMHG | OXYGEN SATURATION: 99 % | WEIGHT: 122 LBS | BODY MASS INDEX: 20.83 KG/M2 | HEIGHT: 64 IN | SYSTOLIC BLOOD PRESSURE: 111 MMHG | HEART RATE: 79 BPM

## 2020-09-17 PROCEDURE — 93000 ELECTROCARDIOGRAM COMPLETE: CPT

## 2020-09-17 PROCEDURE — 99202 OFFICE O/P NEW SF 15 MIN: CPT

## 2020-09-23 NOTE — ED ADULT NURSE NOTE - CAS TRG GEN SKIN CONDITION
Warm V-Y Plasty Text: The defect edges were debeveled with a #15 scalpel blade.  Given the location of the defect, shape of the defect and the proximity to free margins an V-Y advancement flap was deemed most appropriate.  Using a sterile surgical marker, an appropriate advancement flap was drawn incorporating the defect and placing the expected incisions within the relaxed skin tension lines where possible.    The area thus outlined was incised deep to adipose tissue with a #15 scalpel blade.  The skin margins were undermined to an appropriate distance in all directions utilizing iris scissors.

## 2020-10-07 ENCOUNTER — TRANSCRIPTION ENCOUNTER (OUTPATIENT)
Age: 40
End: 2020-10-07

## 2020-10-11 ENCOUNTER — NON-APPOINTMENT (OUTPATIENT)
Age: 40
End: 2020-10-11

## 2020-10-11 NOTE — REASON FOR VISIT
[Initial Evaluation] : an initial evaluation of [Hyperlipidemia] : hyperlipidemia [Medication Management] : Medication management

## 2020-10-11 NOTE — HISTORY OF PRESENT ILLNESS
[FreeTextEntry1] : Referred by Endo for high chol\par Prior smoker. fam h/o CAD, sedentary lifestyle\par Does not exercise\par No longer has palps since thyroid has been regulated\par

## 2020-10-11 NOTE — PHYSICAL EXAM
[General Appearance - Well Developed] : well developed [Normal Appearance] : normal appearance [Well Groomed] : well groomed [General Appearance - Well Nourished] : well nourished [No Deformities] : no deformities [General Appearance - In No Acute Distress] : no acute distress [Normal Conjunctiva] : the conjunctiva exhibited no abnormalities [Eyelids - No Xanthelasma] : the eyelids demonstrated no xanthelasmas [Normal Oral Mucosa] : normal oral mucosa [No Oral Pallor] : no oral pallor [No Oral Cyanosis] : no oral cyanosis [Normal Jugular Venous A Waves Present] : normal jugular venous A waves present [Normal Jugular Venous V Waves Present] : normal jugular venous V waves present [No Jugular Venous Calderon A Waves] : no jugular venous calderon A waves [Heart Rate And Rhythm] : heart rate and rhythm were normal [Heart Sounds] : normal S1 and S2 [Murmurs] : no murmurs present [Respiration, Rhythm And Depth] : normal respiratory rhythm and effort [Exaggerated Use Of Accessory Muscles For Inspiration] : no accessory muscle use [Auscultation Breath Sounds / Voice Sounds] : lungs were clear to auscultation bilaterally [Abdomen Soft] : soft [Abdomen Tenderness] : non-tender [Abdomen Mass (___ Cm)] : no abdominal mass palpated [Abnormal Walk] : normal gait [Gait - Sufficient For Exercise Testing] : the gait was sufficient for exercise testing [Nail Clubbing] : no clubbing of the fingernails [Cyanosis, Localized] : no localized cyanosis [Petechial Hemorrhages (___cm)] : no petechial hemorrhages [Skin Color & Pigmentation] : normal skin color and pigmentation [] : no rash [No Venous Stasis] : no venous stasis [Skin Lesions] : no skin lesions [No Skin Ulcers] : no skin ulcer [No Xanthoma] : no  xanthoma was observed [Oriented To Time, Place, And Person] : oriented to person, place, and time [Affect] : the affect was normal [Mood] : the mood was normal [No Anxiety] : not feeling anxious

## 2020-10-11 NOTE — DISCUSSION/SUMMARY
[FreeTextEntry1] : Reviewed lifestyle changes including diet and exercise with her.\par I recc she repeat lipids in 3-5 months\par Smoking cessation discussed as she is considering returning to smoking\par RTO PRN Statement Selected

## 2020-10-13 ENCOUNTER — APPOINTMENT (OUTPATIENT)
Dept: OBGYN | Facility: CLINIC | Age: 40
End: 2020-10-13
Payer: COMMERCIAL

## 2020-10-13 VITALS
HEIGHT: 64 IN | SYSTOLIC BLOOD PRESSURE: 120 MMHG | DIASTOLIC BLOOD PRESSURE: 83 MMHG | WEIGHT: 120 LBS | BODY MASS INDEX: 20.49 KG/M2

## 2020-10-13 DIAGNOSIS — Z86.19 PERSONAL HISTORY OF OTHER INFECTIOUS AND PARASITIC DISEASES: ICD-10-CM

## 2020-10-13 DIAGNOSIS — Z11.3 ENCOUNTER FOR SCREENING FOR INFECTIONS WITH A PREDOMINANTLY SEXUAL MODE OF TRANSMISSION: ICD-10-CM

## 2020-10-13 PROCEDURE — 99214 OFFICE O/P EST MOD 30 MIN: CPT

## 2020-10-15 DIAGNOSIS — B96.89 ACUTE VAGINITIS: ICD-10-CM

## 2020-10-15 DIAGNOSIS — N76.0 ACUTE VAGINITIS: ICD-10-CM

## 2020-10-15 LAB
C TRACH RRNA SPEC QL NAA+PROBE: NOT DETECTED
CANDIDA VAG CYTO: NOT DETECTED
G VAGINALIS+PREV SP MTYP VAG QL MICRO: DETECTED
HBV SURFACE AG SER QL: NONREACTIVE
HIV1+2 AB SPEC QL IA.RAPID: NONREACTIVE
HPV HIGH+LOW RISK DNA PNL CVX: DETECTED
N GONORRHOEA RRNA SPEC QL NAA+PROBE: NOT DETECTED
SOURCE AMPLIFICATION: NORMAL
T PALLIDUM AB SER QL IA: NEGATIVE
T VAGINALIS VAG QL WET PREP: NOT DETECTED

## 2020-10-17 LAB — CYTOLOGY CVX/VAG DOC THIN PREP: NORMAL

## 2020-10-19 NOTE — ED ADULT TRIAGE NOTE - PAIN: PRESENCE, MLM
I spoke with the pt. She states that she has been having lower back pain and right hip. She states that she has gone to the health [facility] before for PT and would like to go back. Referral has been pended. complains of pain/discomfort/abd

## 2020-10-30 ENCOUNTER — NON-APPOINTMENT (OUTPATIENT)
Age: 40
End: 2020-10-30

## 2020-11-12 ENCOUNTER — APPOINTMENT (OUTPATIENT)
Dept: ENDOCRINOLOGY | Facility: CLINIC | Age: 40
End: 2020-11-12
Payer: COMMERCIAL

## 2020-11-12 VITALS
HEART RATE: 87 BPM | SYSTOLIC BLOOD PRESSURE: 116 MMHG | TEMPERATURE: 98.2 F | WEIGHT: 127 LBS | BODY MASS INDEX: 21.8 KG/M2 | DIASTOLIC BLOOD PRESSURE: 60 MMHG | OXYGEN SATURATION: 99 %

## 2020-11-12 PROCEDURE — 99072 ADDL SUPL MATRL&STAF TM PHE: CPT

## 2020-11-12 PROCEDURE — 99213 OFFICE O/P EST LOW 20 MIN: CPT | Mod: 25

## 2020-11-12 PROCEDURE — 36415 COLL VENOUS BLD VENIPUNCTURE: CPT

## 2020-11-12 RX ORDER — PROGESTERONE 100 MG/1
100 CAPSULE ORAL
Qty: 90 | Refills: 3 | Status: DISCONTINUED | COMMUNITY
Start: 2020-10-15 | End: 2020-11-12

## 2020-11-12 RX ORDER — FLUCONAZOLE 150 MG/1
150 TABLET ORAL
Qty: 1 | Refills: 1 | Status: DISCONTINUED | COMMUNITY
Start: 2020-10-23 | End: 2020-11-12

## 2020-11-12 RX ORDER — ESTRADIOL 1 MG/1
1 TABLET ORAL DAILY
Qty: 90 | Refills: 3 | Status: DISCONTINUED | COMMUNITY
Start: 2020-10-15 | End: 2020-11-12

## 2020-11-12 RX ORDER — METRONIDAZOLE 7.5 MG/G
0.75 GEL VAGINAL
Qty: 1 | Refills: 3 | Status: DISCONTINUED | COMMUNITY
Start: 2020-10-15 | End: 2020-11-12

## 2020-11-13 NOTE — ASSESSMENT
[FreeTextEntry1] : 39 yo F with PMH Hashimoto's thyroiditis, Crohn's disease, thyroid nodule\par \par \par 1. Hypothyroidism - continue Lt4 will check TFTs. \par \par 2. Thyroid nodule - no nodules on thyroid US

## 2020-11-13 NOTE — HISTORY OF PRESENT ILLNESS
[FreeTextEntry1] : 41 yo F with PMH Hashimoto's thyroiditis, Crohn's disease, thyroid nodule\par \par Prior Endocrinologist: Dr Charles\par diagnosed with hypothyroidism 2013 in the setting of hair loss\par she is on levothyroxine 100 mcg qd. \par She denies FHx of thyroid disease. \par She denies history of radiation to the head or neck. \par She denies history of amiodarone or lithium use.\par No recent steroids \par reports history of thyroid nodule - reportedly benign FNA\par thyroid US 1/13/20 did not have thyroid nodules\par \par \par

## 2020-11-17 ENCOUNTER — APPOINTMENT (OUTPATIENT)
Dept: GASTROENTEROLOGY | Facility: CLINIC | Age: 40
End: 2020-11-17
Payer: COMMERCIAL

## 2020-11-17 VITALS
SYSTOLIC BLOOD PRESSURE: 120 MMHG | RESPIRATION RATE: 12 BRPM | HEIGHT: 64 IN | HEART RATE: 65 BPM | WEIGHT: 126 LBS | OXYGEN SATURATION: 98 % | TEMPERATURE: 98.7 F | DIASTOLIC BLOOD PRESSURE: 68 MMHG | BODY MASS INDEX: 21.51 KG/M2

## 2020-11-17 PROCEDURE — 99072 ADDL SUPL MATRL&STAF TM PHE: CPT

## 2020-11-17 PROCEDURE — 99213 OFFICE O/P EST LOW 20 MIN: CPT | Mod: 25

## 2020-11-17 NOTE — HISTORY OF PRESENT ILLNESS
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

## 2020-11-17 NOTE — ASSESSMENT
[FreeTextEntry1] : Post op crohn's, surgery June 2020\par \par Plan\par continue humira\par routine blood testing as ordered\par diet as tolerated\par OV in 6 months prior to post op surveillance colonoscopy \par

## 2020-11-18 LAB
25(OH)D3 SERPL-MCNC: 58.2 NG/ML
ALBUMIN SERPL ELPH-MCNC: 4.5 G/DL
ALP BLD-CCNC: 41 U/L
ALT SERPL-CCNC: 11 U/L
ANION GAP SERPL CALC-SCNC: 10 MMOL/L
AST SERPL-CCNC: 21 U/L
BASOPHILS # BLD AUTO: 0.04 K/UL
BASOPHILS NFR BLD AUTO: 0.6 %
BILIRUB SERPL-MCNC: 0.9 MG/DL
BUN SERPL-MCNC: 13 MG/DL
CALCIUM SERPL-MCNC: 9.7 MG/DL
CHLORIDE SERPL-SCNC: 104 MMOL/L
CO2 SERPL-SCNC: 23 MMOL/L
CREAT SERPL-MCNC: 0.78 MG/DL
CRP SERPL-MCNC: 0.19 MG/DL
EOSINOPHIL # BLD AUTO: 0.13 K/UL
EOSINOPHIL NFR BLD AUTO: 1.9 %
GLUCOSE SERPL-MCNC: 89 MG/DL
HCT VFR BLD CALC: 41.9 %
HGB BLD-MCNC: 13.5 G/DL
IMM GRANULOCYTES NFR BLD AUTO: 0.3 %
LYMPHOCYTES # BLD AUTO: 1.62 K/UL
LYMPHOCYTES NFR BLD AUTO: 23.3 %
MAN DIFF?: NORMAL
MCHC RBC-ENTMCNC: 30.5 PG
MCHC RBC-ENTMCNC: 32.2 GM/DL
MCV RBC AUTO: 94.6 FL
MONOCYTES # BLD AUTO: 0.46 K/UL
MONOCYTES NFR BLD AUTO: 6.6 %
NEUTROPHILS # BLD AUTO: 4.67 K/UL
NEUTROPHILS NFR BLD AUTO: 67.3 %
PLATELET # BLD AUTO: 248 K/UL
POTASSIUM SERPL-SCNC: 4.9 MMOL/L
PROT SERPL-MCNC: 7.1 G/DL
RBC # BLD: 4.43 M/UL
RBC # FLD: 12.3 %
SODIUM SERPL-SCNC: 137 MMOL/L
VIT B12 SERPL-MCNC: 389 PG/ML
WBC # FLD AUTO: 6.94 K/UL

## 2020-11-19 ENCOUNTER — NON-APPOINTMENT (OUTPATIENT)
Age: 40
End: 2020-11-19

## 2020-11-19 LAB
25(OH)D3 SERPL-MCNC: 59.9 NG/ML
ALBUMIN SERPL ELPH-MCNC: 4.4 G/DL
ALP BLD-CCNC: 44 U/L
ALT SERPL-CCNC: 13 U/L
ANION GAP SERPL CALC-SCNC: 11 MMOL/L
AST SERPL-CCNC: 20 U/L
BASOPHILS # BLD AUTO: 0.03 K/UL
BASOPHILS NFR BLD AUTO: 0.5 %
BILIRUB SERPL-MCNC: 0.6 MG/DL
BUN SERPL-MCNC: 11 MG/DL
CALCIUM SERPL-MCNC: 9.7 MG/DL
CHLORIDE SERPL-SCNC: 103 MMOL/L
CO2 SERPL-SCNC: 23 MMOL/L
CREAT SERPL-MCNC: 0.73 MG/DL
EOSINOPHIL # BLD AUTO: 0.08 K/UL
EOSINOPHIL NFR BLD AUTO: 1.2 %
ESTIMATED AVERAGE GLUCOSE: 103 MG/DL
GLUCOSE SERPL-MCNC: 91 MG/DL
HBA1C MFR BLD HPLC: 5.2 %
HCT VFR BLD CALC: 40.9 %
HGB BLD-MCNC: 12.8 G/DL
IMM GRANULOCYTES NFR BLD AUTO: 0.3 %
LYMPHOCYTES # BLD AUTO: 1.87 K/UL
LYMPHOCYTES NFR BLD AUTO: 28.7 %
MAN DIFF?: NORMAL
MCHC RBC-ENTMCNC: 30.1 PG
MCHC RBC-ENTMCNC: 31.3 GM/DL
MCV RBC AUTO: 96.2 FL
MONOCYTES # BLD AUTO: 0.4 K/UL
MONOCYTES NFR BLD AUTO: 6.1 %
NEUTROPHILS # BLD AUTO: 4.11 K/UL
NEUTROPHILS NFR BLD AUTO: 63.2 %
PLATELET # BLD AUTO: 235 K/UL
POTASSIUM SERPL-SCNC: 4.3 MMOL/L
PROT SERPL-MCNC: 6.9 G/DL
RBC # BLD: 4.25 M/UL
RBC # FLD: 12.5 %
SODIUM SERPL-SCNC: 138 MMOL/L
T4 FREE SERPL-MCNC: 1.5 NG/DL
TSH SERPL-ACNC: 0.73 UIU/ML
WBC # FLD AUTO: 6.51 K/UL

## 2020-12-05 ENCOUNTER — TRANSCRIPTION ENCOUNTER (OUTPATIENT)
Age: 40
End: 2020-12-05

## 2020-12-23 PROBLEM — Z86.19 HISTORY OF CANDIDIASIS OF VAGINA: Status: RESOLVED | Noted: 2020-10-13 | Resolved: 2020-12-23

## 2020-12-23 PROBLEM — N76.0 BACTERIAL VAGINITIS: Status: RESOLVED | Noted: 2020-10-15 | Resolved: 2020-12-23

## 2021-01-05 ENCOUNTER — NON-APPOINTMENT (OUTPATIENT)
Age: 41
End: 2021-01-05

## 2021-01-05 DIAGNOSIS — U07.1 COVID-19: ICD-10-CM

## 2021-01-20 ENCOUNTER — RX RENEWAL (OUTPATIENT)
Age: 41
End: 2021-01-20

## 2021-03-08 NOTE — PROGRESS NOTE ADULT - PROBLEM/PLAN-5
How Severe Is Your Rash?: severe Is This A New Presentation, Or A Follow-Up?: Rash Additional History: She states epsom salt soaks and a cool bath help relieve the itch. DISPLAY PLAN FREE TEXT

## 2021-03-26 PROBLEM — Z00.00 ENCOUNTER FOR PREVENTIVE HEALTH EXAMINATION: Noted: 2021-03-26

## 2021-03-31 NOTE — DISCHARGE NOTE PROVIDER - NSDCACTIVITY_GEN_ALL_CORE
Walking - Outdoors allowed/Do not make important decisions/Do not drive or operate machinery/Walking - Indoors allowed Unable to assess due to altered alertness/mentation with reduced active participation.

## 2021-04-02 ENCOUNTER — TRANSCRIPTION ENCOUNTER (OUTPATIENT)
Age: 41
End: 2021-04-02

## 2021-04-06 ENCOUNTER — RX RENEWAL (OUTPATIENT)
Age: 41
End: 2021-04-06

## 2021-04-07 DIAGNOSIS — Z12.39 ENCOUNTER FOR OTHER SCREENING FOR MALIGNANT NEOPLASM OF BREAST: ICD-10-CM

## 2021-04-09 ENCOUNTER — NON-APPOINTMENT (OUTPATIENT)
Age: 41
End: 2021-04-09

## 2021-04-23 ENCOUNTER — APPOINTMENT (OUTPATIENT)
Dept: ENDOCRINOLOGY | Facility: CLINIC | Age: 41
End: 2021-04-23
Payer: COMMERCIAL

## 2021-04-23 VITALS
SYSTOLIC BLOOD PRESSURE: 110 MMHG | OXYGEN SATURATION: 99 % | HEIGHT: 64 IN | WEIGHT: 126 LBS | BODY MASS INDEX: 21.51 KG/M2 | DIASTOLIC BLOOD PRESSURE: 60 MMHG | HEART RATE: 70 BPM

## 2021-04-23 PROCEDURE — 99212 OFFICE O/P EST SF 10 MIN: CPT | Mod: 25

## 2021-04-23 PROCEDURE — 36415 COLL VENOUS BLD VENIPUNCTURE: CPT

## 2021-04-23 PROCEDURE — 99072 ADDL SUPL MATRL&STAF TM PHE: CPT

## 2021-04-23 RX ORDER — NORETHINDRONE ACETATE AND ETHINYL ESTRADIOL 1.5; 3 MG/1; UG/1
1.5-3 TABLET ORAL DAILY
Qty: 28 | Refills: 3 | Status: DISCONTINUED | COMMUNITY
Start: 2020-05-05 | End: 2021-04-23

## 2021-04-23 RX ORDER — LEVOTHYROXINE SODIUM 0.1 MG/1
100 TABLET ORAL DAILY
Qty: 90 | Refills: 0 | Status: DISCONTINUED | COMMUNITY
Start: 2020-04-27 | End: 2021-04-23

## 2021-04-23 NOTE — ASSESSMENT
[FreeTextEntry1] : 39 yo F with PMH Hashimoto's thyroiditis, Crohn's disease, thyroid nodule\par \par 1. Hypothyroidism - continue Synthroid will check TFTs. \par \par 2. Thyroid nodule - no nodules on thyroid US\par \par 3. Hair loss - refer for work up. , chronic, also chronic use of OCPs

## 2021-04-23 NOTE — HISTORY OF PRESENT ILLNESS
[FreeTextEntry1] : 41 yo F with PMH Hashimoto's thyroiditis, Crohn's disease, thyroid nodule\par \par Prior Endocrinologist: Dr Charles\par diagnosed with hypothyroidism 2013 in the setting of hair loss\par she is on Synthroid (brand) 100 mcg qd. changed to brand after last visit due to hair loss\par She denies FHx of thyroid disease. \par She denies history of radiation to the head or neck. \par She denies history of amiodarone or lithium use.\par No recent steroids \par reports history of thyroid nodule - reportedly benign FNA\par thyroid US 1/13/20 did not have thyroid nodules\par reports hair loss since the beginning of COVID\par \par \par

## 2021-04-30 LAB
25(OH)D3 SERPL-MCNC: 58.6 NG/ML
ALBUMIN SERPL ELPH-MCNC: 4.7 G/DL
ALP BLD-CCNC: 47 U/L
ALT SERPL-CCNC: 12 U/L
ANION GAP SERPL CALC-SCNC: 10 MMOL/L
AST SERPL-CCNC: 22 U/L
BASOPHILS # BLD AUTO: 0.04 K/UL
BASOPHILS NFR BLD AUTO: 0.6 %
BILIRUB SERPL-MCNC: 1 MG/DL
BUN SERPL-MCNC: 15 MG/DL
CALCIUM SERPL-MCNC: 10 MG/DL
CHLORIDE SERPL-SCNC: 102 MMOL/L
CO2 SERPL-SCNC: 25 MMOL/L
CREAT SERPL-MCNC: 0.84 MG/DL
DHEA-S SERPL-MCNC: 118 UG/DL
EOSINOPHIL # BLD AUTO: 0.11 K/UL
EOSINOPHIL NFR BLD AUTO: 1.6 %
FERRITIN SERPL-MCNC: 66 NG/ML
GLUCOSE SERPL-MCNC: 88 MG/DL
HCT VFR BLD CALC: 43.7 %
HGB BLD-MCNC: 14.1 G/DL
IMM GRANULOCYTES NFR BLD AUTO: 0.3 %
IRON SATN MFR SERPL: 42 %
IRON SERPL-MCNC: 171 UG/DL
LYMPHOCYTES # BLD AUTO: 1.74 K/UL
LYMPHOCYTES NFR BLD AUTO: 25.5 %
MAN DIFF?: NORMAL
MCHC RBC-ENTMCNC: 29.7 PG
MCHC RBC-ENTMCNC: 32.3 GM/DL
MCV RBC AUTO: 92.2 FL
MONOCYTES # BLD AUTO: 0.42 K/UL
MONOCYTES NFR BLD AUTO: 6.1 %
NEUTROPHILS # BLD AUTO: 4.5 K/UL
NEUTROPHILS NFR BLD AUTO: 65.9 %
PLATELET # BLD AUTO: 279 K/UL
POTASSIUM SERPL-SCNC: 4.2 MMOL/L
PROT SERPL-MCNC: 7.6 G/DL
RBC # BLD: 4.74 M/UL
RBC # FLD: 12 %
SODIUM SERPL-SCNC: 137 MMOL/L
T4 FREE SERPL-MCNC: 1.6 NG/DL
TESTOST SERPL-MCNC: 23.7 NG/DL
TESTOSTERONE FREE/WEAKLY BND: 2.9 NG/DL
TESTOSTERONE TOTAL S: 40 NG/DL
TESTOSTERONE, % FREE/WEAKLY BND: 7.3 %
TIBC SERPL-MCNC: 410 UG/DL
TSH SERPL-ACNC: 1.71 UIU/ML
UIBC SERPL-MCNC: 239 UG/DL
WBC # FLD AUTO: 6.83 K/UL

## 2021-05-11 ENCOUNTER — APPOINTMENT (OUTPATIENT)
Dept: OBGYN | Facility: CLINIC | Age: 41
End: 2021-05-11
Payer: COMMERCIAL

## 2021-05-11 VITALS
DIASTOLIC BLOOD PRESSURE: 87 MMHG | WEIGHT: 120 LBS | HEIGHT: 64 IN | SYSTOLIC BLOOD PRESSURE: 126 MMHG | BODY MASS INDEX: 20.49 KG/M2

## 2021-05-11 PROCEDURE — 99214 OFFICE O/P EST MOD 30 MIN: CPT

## 2021-05-11 PROCEDURE — 99072 ADDL SUPL MATRL&STAF TM PHE: CPT

## 2021-05-11 NOTE — PHYSICAL EXAM
[Single ___ cm] : a single [unfilled] ~Ucm [Location: ___] : [unfilled] [Tender] : tender [Exudative] : not exudative [Malodorous] : non-malodorous [Warm] : not warm [Indurated] : non-indurated

## 2021-05-14 ENCOUNTER — TRANSCRIPTION ENCOUNTER (OUTPATIENT)
Age: 41
End: 2021-05-14

## 2021-05-14 LAB
HERPES SIMPLEX 1 DNA: NOT DETECTED
HERPES SIMPLEX 2 DNA: NOT DETECTED
HERPES SIMPLEX SPECIMEN SOURCE: NORMAL

## 2021-05-18 ENCOUNTER — APPOINTMENT (OUTPATIENT)
Dept: GASTROENTEROLOGY | Facility: CLINIC | Age: 41
End: 2021-05-18
Payer: COMMERCIAL

## 2021-05-18 VITALS
TEMPERATURE: 97 F | DIASTOLIC BLOOD PRESSURE: 70 MMHG | HEIGHT: 64 IN | OXYGEN SATURATION: 98 % | BODY MASS INDEX: 20.49 KG/M2 | HEART RATE: 89 BPM | SYSTOLIC BLOOD PRESSURE: 100 MMHG | WEIGHT: 120 LBS

## 2021-05-18 PROCEDURE — 99072 ADDL SUPL MATRL&STAF TM PHE: CPT

## 2021-05-18 PROCEDURE — 99214 OFFICE O/P EST MOD 30 MIN: CPT

## 2021-05-18 NOTE — ASSESSMENT
[FreeTextEntry1] : Post op Crohn's, surgery June 2020 in clinical remission\par \par Plan\par continue Humira [id number]; Check ADA drug and antibody levels. \par diet as tolerated\par will plan for post op surveillance colonoscopy. patient will call to schedule.\par \par Follow up in 6 months

## 2021-05-18 NOTE — PHYSICAL EXAM
[General Appearance - Alert] : alert [General Appearance - In No Acute Distress] : in no acute distress [General Appearance - Well Nourished] : well nourished [General Appearance - Well Developed] : well developed [General Appearance - Well-Appearing] : healthy appearing [] : no respiratory distress [Abdomen Soft] : soft [Abdomen Tenderness] : non-tender [Oriented To Time, Place, And Person] : oriented to person, place, and time [Impaired Insight] : insight and judgment were intact [Affect] : the affect was normal

## 2021-05-18 NOTE — HISTORY OF PRESENT ILLNESS
[Inflammatory Bowel Disease] : inflammatory bowel disease [de-identified] : 40 yr female, post op ileal stricturing CD (diagnosed 2015, surgery June 2020) \par Previously on IFX but switched to ADA x4rizpt in 2016 because of convenience of injection.\par Post op Humira for prophylaxis\par Feeling GREAT\par Normal bowel habit. Only complain: is rarely she will have an episode of loose watery diarrhea.  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

## 2021-05-20 ENCOUNTER — NON-APPOINTMENT (OUTPATIENT)
Age: 41
End: 2021-05-20

## 2021-05-24 ENCOUNTER — NON-APPOINTMENT (OUTPATIENT)
Age: 41
End: 2021-05-24

## 2021-05-24 LAB
ANTIBODIES TO ADALIMUMAB (ATA) CONCENTRATION: < 1.7 U/ML
PROMETHEUS ANSER ADA: NORMAL
PROMETHEUS LABORATORY FOOTER: NORMAL
SERUM ADALIMUMAB (ADA) CONCENTRATION: 21.8 UG/ML

## 2021-06-10 ENCOUNTER — RESULT REVIEW (OUTPATIENT)
Age: 41
End: 2021-06-10

## 2021-06-10 ENCOUNTER — OUTPATIENT (OUTPATIENT)
Dept: OUTPATIENT SERVICES | Facility: HOSPITAL | Age: 41
LOS: 1 days | End: 2021-06-10
Payer: COMMERCIAL

## 2021-06-10 ENCOUNTER — APPOINTMENT (OUTPATIENT)
Dept: MAMMOGRAPHY | Facility: CLINIC | Age: 41
End: 2021-06-10
Payer: COMMERCIAL

## 2021-06-10 ENCOUNTER — APPOINTMENT (OUTPATIENT)
Dept: ULTRASOUND IMAGING | Facility: CLINIC | Age: 41
End: 2021-06-10
Payer: COMMERCIAL

## 2021-06-10 DIAGNOSIS — Z98.890 OTHER SPECIFIED POSTPROCEDURAL STATES: Chronic | ICD-10-CM

## 2021-06-10 DIAGNOSIS — Z98.89 OTHER SPECIFIED POSTPROCEDURAL STATES: Chronic | ICD-10-CM

## 2021-06-10 DIAGNOSIS — Z00.8 ENCOUNTER FOR OTHER GENERAL EXAMINATION: ICD-10-CM

## 2021-06-10 PROCEDURE — 76641 ULTRASOUND BREAST COMPLETE: CPT | Mod: 26,50

## 2021-06-10 PROCEDURE — 77063 BREAST TOMOSYNTHESIS BI: CPT | Mod: 26

## 2021-06-10 PROCEDURE — 77067 SCR MAMMO BI INCL CAD: CPT | Mod: 26

## 2021-06-10 PROCEDURE — 76641 ULTRASOUND BREAST COMPLETE: CPT

## 2021-06-10 PROCEDURE — 77067 SCR MAMMO BI INCL CAD: CPT

## 2021-06-10 PROCEDURE — 77063 BREAST TOMOSYNTHESIS BI: CPT

## 2021-07-01 ENCOUNTER — RX RENEWAL (OUTPATIENT)
Age: 41
End: 2021-07-01

## 2021-07-11 ENCOUNTER — APPOINTMENT (OUTPATIENT)
Dept: DISASTER EMERGENCY | Facility: CLINIC | Age: 41
End: 2021-07-11

## 2021-07-11 DIAGNOSIS — Z01.818 ENCOUNTER FOR OTHER PREPROCEDURAL EXAMINATION: ICD-10-CM

## 2021-07-12 LAB — SARS-COV-2 N GENE NPH QL NAA+PROBE: NOT DETECTED

## 2021-07-14 ENCOUNTER — APPOINTMENT (OUTPATIENT)
Dept: GASTROENTEROLOGY | Facility: AMBULATORY MEDICAL SERVICES | Age: 41
End: 2021-07-14
Payer: COMMERCIAL

## 2021-07-14 PROCEDURE — 45380 COLONOSCOPY AND BIOPSY: CPT

## 2021-07-23 ENCOUNTER — NON-APPOINTMENT (OUTPATIENT)
Age: 41
End: 2021-07-23

## 2021-08-10 NOTE — H&P ADULT - OPHTHALMOLOGIC
details… Azithromycin Pregnancy And Lactation Text: This medication is considered safe during pregnancy and is also secreted in breast milk.

## 2021-09-17 RX ORDER — LEVOTHYROXINE SODIUM 100 UG/1
100 TABLET ORAL DAILY
Qty: 30 | Refills: 0 | Status: COMPLETED | COMMUNITY
Start: 2021-01-21 | End: 2021-10-16

## 2021-10-29 ENCOUNTER — NON-APPOINTMENT (OUTPATIENT)
Age: 41
End: 2021-10-29

## 2021-11-02 ENCOUNTER — APPOINTMENT (OUTPATIENT)
Dept: ENDOCRINOLOGY | Facility: CLINIC | Age: 41
End: 2021-11-02
Payer: COMMERCIAL

## 2021-11-02 VITALS
OXYGEN SATURATION: 98 % | BODY MASS INDEX: 21.21 KG/M2 | TEMPERATURE: 98.2 F | DIASTOLIC BLOOD PRESSURE: 75 MMHG | HEART RATE: 77 BPM | WEIGHT: 124.25 LBS | SYSTOLIC BLOOD PRESSURE: 115 MMHG | HEIGHT: 64 IN

## 2021-11-02 PROCEDURE — 99214 OFFICE O/P EST MOD 30 MIN: CPT

## 2021-11-02 NOTE — DATA REVIEWED
[FreeTextEntry1] : Labs 4/2021:\par TSH 4.71\par Free T4 1.6\par Vit D 58.6\par Testosterone 23.7\par CMP normal

## 2021-11-02 NOTE — HISTORY OF PRESENT ILLNESS
[FreeTextEntry1] : 40 yo F with PMH Hashimoto's thyroiditis, Crohn's disease\par \par Prior Endocrinologist: Dr Charles\par diagnosed with hypothyroidism 2013 in the setting of hair loss\par she is on Synthroid (brand) 100 mcg qd. changed to brand due to hair loss\par Sister with hypothyroidism\par She denies history of radiation to the head or neck. \par She denies history of amiodarone or lithium use.\par No recent steroids \par reports history of thyroid nodule - reportedly benign FNA\par thyroid US 1/13/20 did not have thyroid nodules\par reports hair loss since the beginning of COVID\par \par \par

## 2021-11-02 NOTE — ASSESSMENT
[FreeTextEntry1] : 42 yo F with Hashimoto's thyroiditis clinically euthyroid on exam. Check TFTs on Synthroid 100 mcg daily. Will adjust further as needed. Can follow-up in 12 months if chemically euthyroid. \par \par New patient to me previously seen by Dr. Barnes. Reviewed full history with patient. Prep time with review of labs and interval progress notes and consultations \par Discussion with patient regarding hypothyroidism regimen with management plan, treatment options and goals of care answering all patients questions and addressing all concerns\par Post-visit completion charting and review\par Total Time 32 min\par \par Specifically causes, evaluation, treatment options, risks, complications, and benefits of available therapies were discussed. Questions were answered.\par \par The submitted E/M billing level for this visit reflects the total time spent on the day of the visit including face-to-face time spent with the patient, non-face-to-face review of medical records and relevant information, documentation, and asynchronous communication with the patient after a visit via phone, email, or patient’s EHR portal after the visit. \par The medical records reviewed are either scanned into the chart or reviewed with the patient using a patient’s electronic medical records portal for patients with records not available to [facility] via electronic transmission platforms from other institutions and labs. \par Time spend counseling and performing coordination of care was also included in determining the appropriate EM billing level.\par \par I have reviewed and verified information regarding the chief complaint and history recorded by the ancillary staff and/or the patient. I have independently reviewed and interpreted tests performed by other physicians and facilities as necessary. \par \par I have discussed with the patient differential diagnosis, reason for auxiliary tests if ordered, risks, benefits, alternatives, and complications of each form of therapy were discussed. \par

## 2021-11-02 NOTE — PHYSICAL EXAM
[Alert] : alert [Well Nourished] : well nourished [No Acute Distress] : no acute distress [Well Developed] : well developed [No Proptosis] : no proptosis [Thyroid Not Enlarged] : the thyroid was not enlarged [No Thyroid Nodules] : no palpable thyroid nodules [No Respiratory Distress] : no respiratory distress [No Accessory Muscle Use] : no accessory muscle use [Normal Rate and Effort] : normal respiratory rate and effort [Clear to Auscultation] : lungs were clear to auscultation bilaterally [Normal S1, S2] : normal S1 and S2 [Normal Rate] : heart rate was normal [Regular Rhythm] : with a regular rhythm [No Edema] : no peripheral edema [Normal Bowel Sounds] : normal bowel sounds [Not Tender] : non-tender [Not Distended] : not distended [Soft] : abdomen soft [Oriented x3] : oriented to person, place, and time [Normal Affect] : the affect was normal [Normal Mood] : the mood was normal

## 2021-11-02 NOTE — REVIEW OF SYSTEMS
[Fatigue] : fatigue [Recent Weight Gain (___ Lbs)] : no recent weight gain [Visual Field Defect] : no visual field defect [Dysphagia] : no dysphagia [Neck Pain] : no neck pain [Dysphonia] : no dysphonia [Palpitations] : no palpitations [Shortness Of Breath] : no shortness of breath [Nausea] : no nausea [Vomiting] : no vomiting [Polyuria] : no polyuria [Cold Intolerance] : no cold intolerance [Heat Intolerance] : no heat intolerance [All other systems negative] : All other systems negative [FreeTextEntry7] : +crohns

## 2021-11-03 LAB
25(OH)D3 SERPL-MCNC: 57.8 NG/ML
ALBUMIN SERPL ELPH-MCNC: 4.4 G/DL
ALP BLD-CCNC: 50 U/L
ALT SERPL-CCNC: 13 U/L
ANION GAP SERPL CALC-SCNC: 13 MMOL/L
AST SERPL-CCNC: 20 U/L
BASOPHILS # BLD AUTO: 0.03 K/UL
BASOPHILS NFR BLD AUTO: 0.4 %
BILIRUB SERPL-MCNC: 0.7 MG/DL
BUN SERPL-MCNC: 10 MG/DL
CALCIUM SERPL-MCNC: 9.6 MG/DL
CHLORIDE SERPL-SCNC: 103 MMOL/L
CHOLEST SERPL-MCNC: 197 MG/DL
CO2 SERPL-SCNC: 22 MMOL/L
CREAT SERPL-MCNC: 0.71 MG/DL
EOSINOPHIL # BLD AUTO: 0.1 K/UL
EOSINOPHIL NFR BLD AUTO: 1.3 %
GLUCOSE SERPL-MCNC: 93 MG/DL
HCT VFR BLD CALC: 40.2 %
HDLC SERPL-MCNC: 63 MG/DL
HGB BLD-MCNC: 13.4 G/DL
IMM GRANULOCYTES NFR BLD AUTO: 0.4 %
LDLC SERPL CALC-MCNC: 113 MG/DL
LYMPHOCYTES # BLD AUTO: 1.99 K/UL
LYMPHOCYTES NFR BLD AUTO: 26.6 %
MAN DIFF?: NORMAL
MCHC RBC-ENTMCNC: 30.5 PG
MCHC RBC-ENTMCNC: 33.3 GM/DL
MCV RBC AUTO: 91.4 FL
MONOCYTES # BLD AUTO: 0.5 K/UL
MONOCYTES NFR BLD AUTO: 6.7 %
NEUTROPHILS # BLD AUTO: 4.84 K/UL
NEUTROPHILS NFR BLD AUTO: 64.6 %
NONHDLC SERPL-MCNC: 135 MG/DL
PLATELET # BLD AUTO: 348 K/UL
POTASSIUM SERPL-SCNC: 4.3 MMOL/L
PROT SERPL-MCNC: 7.2 G/DL
RBC # BLD: 4.4 M/UL
RBC # FLD: 12.8 %
SODIUM SERPL-SCNC: 137 MMOL/L
T4 FREE SERPL-MCNC: 1.5 NG/DL
TRIGL SERPL-MCNC: 109 MG/DL
TSH SERPL-ACNC: 1.42 UIU/ML
WBC # FLD AUTO: 7.49 K/UL

## 2021-12-29 ENCOUNTER — NON-APPOINTMENT (OUTPATIENT)
Age: 41
End: 2021-12-29

## 2022-02-01 ENCOUNTER — NON-APPOINTMENT (OUTPATIENT)
Age: 42
End: 2022-02-01

## 2022-02-01 ENCOUNTER — APPOINTMENT (OUTPATIENT)
Dept: OBGYN | Facility: CLINIC | Age: 42
End: 2022-02-01
Payer: COMMERCIAL

## 2022-02-01 VITALS
BODY MASS INDEX: 21.34 KG/M2 | HEIGHT: 64 IN | SYSTOLIC BLOOD PRESSURE: 114 MMHG | WEIGHT: 125 LBS | DIASTOLIC BLOOD PRESSURE: 72 MMHG

## 2022-02-01 DIAGNOSIS — Z01.419 ENCOUNTER FOR GYNECOLOGICAL EXAMINATION (GENERAL) (ROUTINE) W/OUT ABNORMAL FINDINGS: ICD-10-CM

## 2022-02-01 PROCEDURE — 99396 PREV VISIT EST AGE 40-64: CPT

## 2022-02-10 LAB
CYTOLOGY CVX/VAG DOC THIN PREP: NORMAL
HPV HIGH+LOW RISK DNA PNL CVX: DETECTED

## 2022-02-15 ENCOUNTER — APPOINTMENT (OUTPATIENT)
Dept: OBGYN | Facility: CLINIC | Age: 42
End: 2022-02-15
Payer: COMMERCIAL

## 2022-02-15 VITALS — BODY MASS INDEX: 21.34 KG/M2 | HEIGHT: 64 IN | WEIGHT: 125 LBS

## 2022-02-15 PROCEDURE — 57456 ENDOCERV CURETTAGE W/SCOPE: CPT

## 2022-02-15 NOTE — PROCEDURE
[Colposcopy] : Colposcopy  [Consent Obtained] : Consent obtained [Benefits] : benefits [Alternatives] : alternatives [Bleeding] : bleeding [HPV High Risk] : HPV high risk [No Premedication] : no premedication [Pap Performed] : pap not performed [SCI Fully Visualized] : SCI not fully visualized [ECC Performed] : ECC performed [No Abnormalities] : no abnormalities [Biopsy] : biopsy not taken [Tolerated Well] : the patient tolerated the procedure well [de-identified] : persistent positive HPV

## 2022-02-23 LAB — CORE LAB BIOPSY: NORMAL

## 2022-03-23 ENCOUNTER — NON-APPOINTMENT (OUTPATIENT)
Age: 42
End: 2022-03-23

## 2022-03-28 ENCOUNTER — NON-APPOINTMENT (OUTPATIENT)
Age: 42
End: 2022-03-28

## 2022-04-01 ENCOUNTER — APPOINTMENT (OUTPATIENT)
Dept: ULTRASOUND IMAGING | Facility: CLINIC | Age: 42
End: 2022-04-01
Payer: COMMERCIAL

## 2022-04-01 PROCEDURE — 76830 TRANSVAGINAL US NON-OB: CPT

## 2022-04-07 ENCOUNTER — TRANSCRIPTION ENCOUNTER (OUTPATIENT)
Age: 42
End: 2022-04-07

## 2022-04-08 ENCOUNTER — NON-APPOINTMENT (OUTPATIENT)
Age: 42
End: 2022-04-08

## 2022-04-26 ENCOUNTER — NON-APPOINTMENT (OUTPATIENT)
Age: 42
End: 2022-04-26

## 2022-06-06 ENCOUNTER — RX RENEWAL (OUTPATIENT)
Age: 42
End: 2022-06-06

## 2022-06-13 ENCOUNTER — RESULT REVIEW (OUTPATIENT)
Age: 42
End: 2022-06-13

## 2022-06-13 ENCOUNTER — APPOINTMENT (OUTPATIENT)
Dept: MAMMOGRAPHY | Facility: CLINIC | Age: 42
End: 2022-06-13
Payer: COMMERCIAL

## 2022-06-13 ENCOUNTER — APPOINTMENT (OUTPATIENT)
Dept: ULTRASOUND IMAGING | Facility: CLINIC | Age: 42
End: 2022-06-13
Payer: COMMERCIAL

## 2022-06-13 PROCEDURE — 77067 SCR MAMMO BI INCL CAD: CPT

## 2022-06-13 PROCEDURE — 76641 ULTRASOUND BREAST COMPLETE: CPT | Mod: 50

## 2022-06-13 PROCEDURE — 77063 BREAST TOMOSYNTHESIS BI: CPT

## 2022-08-16 ENCOUNTER — NON-APPOINTMENT (OUTPATIENT)
Age: 42
End: 2022-08-16

## 2022-08-30 ENCOUNTER — RESULT REVIEW (OUTPATIENT)
Age: 42
End: 2022-08-30

## 2022-08-30 ENCOUNTER — APPOINTMENT (OUTPATIENT)
Dept: GASTROENTEROLOGY | Facility: CLINIC | Age: 42
End: 2022-08-30

## 2022-08-30 PROCEDURE — 99443: CPT

## 2022-09-13 ENCOUNTER — APPOINTMENT (OUTPATIENT)
Dept: MRI IMAGING | Facility: CLINIC | Age: 42
End: 2022-09-13
Payer: COMMERCIAL

## 2022-09-13 PROCEDURE — 72196 MRI PELVIS W/DYE: CPT

## 2022-09-13 PROCEDURE — 74182 MRI ABDOMEN W/CONTRAST: CPT

## 2022-09-13 PROCEDURE — A9585: CPT

## 2022-09-17 ENCOUNTER — TRANSCRIPTION ENCOUNTER (OUTPATIENT)
Age: 42
End: 2022-09-17

## 2022-10-13 ENCOUNTER — APPOINTMENT (OUTPATIENT)
Dept: INTERNAL MEDICINE | Facility: CLINIC | Age: 42
End: 2022-10-13

## 2022-10-13 ENCOUNTER — APPOINTMENT (OUTPATIENT)
Dept: FAMILY MEDICINE | Facility: CLINIC | Age: 42
End: 2022-10-13

## 2022-10-13 ENCOUNTER — NON-APPOINTMENT (OUTPATIENT)
Age: 42
End: 2022-10-13

## 2022-10-13 VITALS
BODY MASS INDEX: 21.42 KG/M2 | DIASTOLIC BLOOD PRESSURE: 75 MMHG | TEMPERATURE: 98.4 F | HEIGHT: 64 IN | SYSTOLIC BLOOD PRESSURE: 116 MMHG | HEART RATE: 64 BPM | OXYGEN SATURATION: 99 % | WEIGHT: 125.44 LBS

## 2022-10-13 DIAGNOSIS — E78.00 PURE HYPERCHOLESTEROLEMIA, UNSPECIFIED: ICD-10-CM

## 2022-10-13 DIAGNOSIS — E03.9 HYPOTHYROIDISM, UNSPECIFIED: ICD-10-CM

## 2022-10-13 DIAGNOSIS — E53.8 DEFICIENCY OF OTHER SPECIFIED B GROUP VITAMINS: ICD-10-CM

## 2022-10-13 DIAGNOSIS — Z00.00 ENCOUNTER FOR GENERAL ADULT MEDICAL EXAMINATION W/OUT ABNORMAL FINDINGS: ICD-10-CM

## 2022-10-13 DIAGNOSIS — R92.2 INCONCLUSIVE MAMMOGRAM: ICD-10-CM

## 2022-10-13 DIAGNOSIS — R87.610 ATYPICAL SQUAMOUS CELLS OF UNDETERMINED SIGNIFICANCE ON CYTOLOGIC SMEAR OF CERVIX (ASC-US): ICD-10-CM

## 2022-10-13 DIAGNOSIS — Z01.818 ENCOUNTER FOR OTHER PREPROCEDURAL EXAMINATION: ICD-10-CM

## 2022-10-13 DIAGNOSIS — Z29.9 ENCOUNTER FOR PROPHYLACTIC MEASURES, UNSPECIFIED: ICD-10-CM

## 2022-10-13 DIAGNOSIS — R87.810 ATYPICAL SQUAMOUS CELLS OF UNDETERMINED SIGNIFICANCE ON CYTOLOGIC SMEAR OF CERVIX (ASC-US): ICD-10-CM

## 2022-10-13 PROCEDURE — 99386 PREV VISIT NEW AGE 40-64: CPT | Mod: 25

## 2022-10-13 PROCEDURE — 36415 COLL VENOUS BLD VENIPUNCTURE: CPT

## 2022-10-13 PROCEDURE — G0444 DEPRESSION SCREEN ANNUAL: CPT | Mod: 59

## 2022-10-13 PROCEDURE — 93000 ELECTROCARDIOGRAM COMPLETE: CPT | Mod: 59

## 2022-10-13 RX ORDER — SODIUM SULFATE, POTASSIUM SULFATE, MAGNESIUM SULFATE 17.5; 3.13; 1.6 G/ML; G/ML; G/ML
17.5-3.13-1.6 SOLUTION, CONCENTRATE ORAL
Qty: 1 | Refills: 0 | Status: COMPLETED | COMMUNITY
Start: 2021-05-18 | End: 2022-10-13

## 2022-10-14 ENCOUNTER — NON-APPOINTMENT (OUTPATIENT)
Age: 42
End: 2022-10-14

## 2022-10-14 LAB
25(OH)D3 SERPL-MCNC: 60.6 NG/ML
ALBUMIN SERPL ELPH-MCNC: 4.7 G/DL
ALP BLD-CCNC: 46 U/L
ALT SERPL-CCNC: 9 U/L
ANION GAP SERPL CALC-SCNC: 15 MMOL/L
APPEARANCE: CLEAR
APTT BLD: 27.7 SEC
AST SERPL-CCNC: 20 U/L
BACTERIA: NEGATIVE
BASOPHILS # BLD AUTO: 0.03 K/UL
BASOPHILS NFR BLD AUTO: 0.4 %
BILIRUB SERPL-MCNC: 0.8 MG/DL
BILIRUBIN URINE: NEGATIVE
BLOOD URINE: ABNORMAL
BUN SERPL-MCNC: 11 MG/DL
C TRACH RRNA SPEC QL NAA+PROBE: NOT DETECTED
CALCIUM SERPL-MCNC: 9.6 MG/DL
CHLORIDE SERPL-SCNC: 103 MMOL/L
CHOLEST SERPL-MCNC: 193 MG/DL
CO2 SERPL-SCNC: 20 MMOL/L
COLOR: COLORLESS
CREAT SERPL-MCNC: 0.7 MG/DL
EGFR: 111 ML/MIN/1.73M2
EOSINOPHIL # BLD AUTO: 0.09 K/UL
EOSINOPHIL NFR BLD AUTO: 1.3 %
ESTIMATED AVERAGE GLUCOSE: 108 MG/DL
FERRITIN SERPL-MCNC: 46 NG/ML
FOLATE SERPL-MCNC: >20 NG/ML
GLUCOSE QUALITATIVE U: NEGATIVE
GLUCOSE SERPL-MCNC: 88 MG/DL
HBA1C MFR BLD HPLC: 5.4 %
HBV SURFACE AB SERPL IA-ACNC: 81.7 MIU/ML
HCG SERPL-MCNC: <1 MIU/ML
HCT VFR BLD CALC: 43.6 %
HCV AB SER QL: NONREACTIVE
HCV S/CO RATIO: 0.16 S/CO
HDLC SERPL-MCNC: 62 MG/DL
HGB BLD-MCNC: 14.1 G/DL
HIV1+2 AB SPEC QL IA.RAPID: NONREACTIVE
HYALINE CASTS: 0 /LPF
IMM GRANULOCYTES NFR BLD AUTO: 0.3 %
INR PPP: 0.96 RATIO
IRON SATN MFR SERPL: 41 %
IRON SERPL-MCNC: 173 UG/DL
KETONES URINE: NEGATIVE
LDLC SERPL CALC-MCNC: 99 MG/DL
LEUKOCYTE ESTERASE URINE: NEGATIVE
LYMPHOCYTES # BLD AUTO: 2.33 K/UL
LYMPHOCYTES NFR BLD AUTO: 32.7 %
MAN DIFF?: NORMAL
MCHC RBC-ENTMCNC: 30.4 PG
MCHC RBC-ENTMCNC: 32.3 GM/DL
MCV RBC AUTO: 94 FL
MICROSCOPIC-UA: NORMAL
MONOCYTES # BLD AUTO: 0.47 K/UL
MONOCYTES NFR BLD AUTO: 6.6 %
N GONORRHOEA RRNA SPEC QL NAA+PROBE: NOT DETECTED
NEUTROPHILS # BLD AUTO: 4.18 K/UL
NEUTROPHILS NFR BLD AUTO: 58.7 %
NITRITE URINE: NEGATIVE
NONHDLC SERPL-MCNC: 131 MG/DL
PH URINE: 5.5
PLATELET # BLD AUTO: 354 K/UL
POTASSIUM SERPL-SCNC: 4 MMOL/L
PROT SERPL-MCNC: 7.4 G/DL
PROTEIN URINE: NEGATIVE
PT BLD: 11.1 SEC
RBC # BLD: 4.64 M/UL
RBC # FLD: 12.1 %
RED BLOOD CELLS URINE: 1 /HPF
SODIUM SERPL-SCNC: 139 MMOL/L
SOURCE AMPLIFICATION: NORMAL
SPECIFIC GRAVITY URINE: 1
SQUAMOUS EPITHELIAL CELLS: 0 /HPF
TIBC SERPL-MCNC: 426 UG/DL
TRIGL SERPL-MCNC: 160 MG/DL
TSH SERPL-ACNC: 0.55 UIU/ML
UIBC SERPL-MCNC: 253 UG/DL
UROBILINOGEN URINE: NORMAL
VIT B12 SERPL-MCNC: 396 PG/ML
WBC # FLD AUTO: 7.12 K/UL
WHITE BLOOD CELLS URINE: 0 /HPF

## 2022-10-17 ENCOUNTER — NON-APPOINTMENT (OUTPATIENT)
Age: 42
End: 2022-10-17

## 2022-10-17 NOTE — PHYSICAL EXAM
[No Acute Distress] : no acute distress [Well Nourished] : well nourished [Well Developed] : well developed [Normal Sclera/Conjunctiva] : normal sclera/conjunctiva [PERRL] : pupils equal round and reactive to light [EOMI] : extraocular movements intact [Normal Outer Ear/Nose] : the outer ears and nose were normal in appearance [Normal Oropharynx] : the oropharynx was normal [Normal TMs] : both tympanic membranes were normal [No Lymphadenopathy] : no lymphadenopathy [Supple] : supple [No Respiratory Distress] : no respiratory distress  [No Accessory Muscle Use] : no accessory muscle use [Clear to Auscultation] : lungs were clear to auscultation bilaterally [Normal Rate] : normal rate  [Regular Rhythm] : with a regular rhythm [Normal S1, S2] : normal S1 and S2 [Pedal Pulses Present] : the pedal pulses are present [Soft] : abdomen soft [Non Tender] : non-tender [Non-distended] : non-distended [No HSM] : no HSM [Normal Bowel Sounds] : normal bowel sounds [Normal Posterior Cervical Nodes] : no posterior cervical lymphadenopathy [Normal Anterior Cervical Nodes] : no anterior cervical lymphadenopathy [No CVA Tenderness] : no CVA  tenderness [No Spinal Tenderness] : no spinal tenderness [No Joint Swelling] : no joint swelling [Grossly Normal Strength/Tone] : grossly normal strength/tone [No Rash] : no rash [Coordination Grossly Intact] : coordination grossly intact [No Focal Deficits] : no focal deficits [Normal Gait] : normal gait [Deep Tendon Reflexes (DTR)] : deep tendon reflexes were 2+ and symmetric [Normal Affect] : the affect was normal [Alert and Oriented x3] : oriented to person, place, and time [Normal Mood] : the mood was normal [Normal Insight/Judgement] : insight and judgment were intact [Normal Appearance] : normal in appearance [No Masses] : no palpable masses [No Nipple Discharge] : no nipple discharge [No Axillary Lymphadenopathy] : no axillary lymphadenopathy [de-identified] : MICHAELA mccallum present in room

## 2022-10-17 NOTE — HISTORY OF PRESENT ILLNESS
[de-identified] : 42 year old female here for CPE.\par Pt feels well and has no complaints.\par Last covid 1 J&J 9/2021\par last tetanus 2/12/15\par flu shot last week got in [facility] \par OBGYN Dina Hassan\par MAMMO/US up to date - repeat yearly \par Last PAP 2022 - negative for intraepithelial lesions, + HPV following gyn - colposcopy \par colonoscopy was 2021  - Dr. Kelly - following GI - MR of abdomen recently  done. \par Pt has hx of hypothyroidsim and chrons - on humaria and levothyroxine - feeling well no complaints.\par \par Pt is scheduled breast augmentation with Dr. Ryley Cabrales on Nov 3, 2022 for Breast Augmentation. Fax: [phone]\par Denies history of MI, arrhythmia or valvular disease. The patient has greater than 4 mets exercise tolerance, never experiencing chest pains or dyspnea. There is no history of asthma or copd. There is no history of bleeding diathesis, DVT's or PE's. There is no history of adverse reactions to anesthesia or complications with prior surgeries.\par \par Pt denies any fever, chills, body aches, headache, vision changes, epistaxis, hearing loss, sore throat, sinus congestion, nasal congestion, rhinorrhea, otalgia, cough, sob, chest pain, palpitations, nausea, vomiting, abdominal pain, diarrhea, constipation, dysuria, urinary frequency, urgency, hematuria, urinary or fecal incontinence, numbness, tingling, weakness..\par

## 2022-10-17 NOTE — ASSESSMENT
[FreeTextEntry1] : 42 year old female here for CPE.\par \par #CPE\par - flu vaccinated\par - tetanus vaccinated\par - ekg reviewed and interpreted - NSR no acute st or t wave changes\par - up to date with colonoscopy following gi and Mammo/US of breast and following obgyn\par - up to date with pap - following obgyn\par - f/u blood and urine\par - history of   +HPV reports following obgyn - had colposcopy \par \par #Chrons\par - following GI\par - stable with humaria\par \par #Hypothyroidism\par - continue synthroid\par - f/u endo\par - US of thyroid\par - reviewed previous US thyroid\par \par #Preoperative\par - Breast Implants - Nov 3, 2022\par - Dr. Ryley Cabrales MD\par - Fax Number: [phone]\par - Denies history of MI, arrythmia or valvular disease. The patient has greater than 4 mets exercise tolerance, never experiencing chest pains or dyspnea. There is no history of asthma or copd. There is no history of bleeding diathesis, DVT's or PE's. There is no history of adverse reactions to anesthesia or complications with prior surgeries.\par - ekg reviewed and interpreted - NSR no acute st or t wave changes\par - f/u blood work \par - reviewed and interperted previous blood and imaging.\par - avoid any nsaids/asa 7-14 days prior to surgery\par - pt low risk for low risk procedure\par \par #High Cholesterol\par - f/u Lipid\par \par #B12 deficiency\par - f/u b12 folate levels.\par \par pt agrees and understands plan via teach back method. all questions answered.\par \par \par ADDENDUM: 10/17/2022 - blood work reviewed \par pt low risk for low risk procedure\par pt is medically optimized for procedure.\par Fax to Dr. Ryley Cabrales office [phone]\par

## 2022-10-17 NOTE — HEALTH RISK ASSESSMENT
[Good] : ~his/her~  mood as  good [Former] : Former [Yes] : Yes [1 or 2 (0 pts)] : 1 or 2 (0 points) [Never (0 pts)] : Never (0 points) [No falls in past year] : Patient reported no falls in the past year [Not at All (0)] : 9.) Thoughts that you would be off dead or of hurting yourself in some way? Not at all [PHQ-9 Negative - No further assessment needed] : PHQ-9 Negative - No further assessment needed [I have developed a follow-up plan documented below in the note.] : I have developed a follow-up plan documented below in the note. [HIV Test offered] : HIV Test offered [Hepatitis C test offered] : Hepatitis C test offered [None] : None [With Family] : lives with family [Employed] : employed [College] : College [Single] : single [Sexually Active] : sexually active [Feels Safe at Home] : Feels safe at home [Fully functional (bathing, dressing, toileting, transferring, walking, feeding)] : Fully functional (bathing, dressing, toileting, transferring, walking, feeding) [Reports normal functional visual acuity (ie: able to read med bottle)] : Reports normal functional visual acuity [Smoke Detector] : smoke detector [Carbon Monoxide Detector] : carbon monoxide detector [Seat Belt] :  uses seat belt [Sunscreen] : uses sunscreen [0] : 2) Feeling down, depressed, or hopeless: Not at all (0) [de-identified] : 1 pack per day since 13  [YearQuit] : 2019 [[id number]] : 0 [Change in mental status noted] : No change in mental status noted [Language] : denies difficulty with language [Behavior] : denies difficulty with behavior [Learning/Retaining New Information] : denies difficulty learning/retaining new information [Handling Complex Tasks] : denies difficulty handling complex tasks [Reasoning] : denies difficulty with reasoning [Spatial Ability and Orientation] : denies difficulty with spatial ability and orientation [High Risk Behavior] : no high risk behavior [Reports changes in hearing] : Reports no changes in hearing [Reports changes in vision] : Reports no changes in vision [Reports changes in dental health] : Reports no changes in dental health [Guns at Home] : no guns at home [Safety elements used in home] : no safety elements used in home [Travel to Developing Areas] : does not  travel to developing areas [TB Exposure] : is not being exposed to tuberculosis [Caregiver Concerns] : does not have caregiver concerns [MammogramComments] : reviewed previous mammo and us of breast  [PapSmearComments] : + HPV uptodate following obgyn [ColonoscopyComments] : last 2021 [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

## 2022-10-18 ENCOUNTER — APPOINTMENT (OUTPATIENT)
Dept: ULTRASOUND IMAGING | Facility: CLINIC | Age: 42
End: 2022-10-18

## 2022-10-18 PROCEDURE — 76536 US EXAM OF HEAD AND NECK: CPT

## 2022-10-25 ENCOUNTER — NON-APPOINTMENT (OUTPATIENT)
Age: 42
End: 2022-10-25

## 2022-10-28 ENCOUNTER — NON-APPOINTMENT (OUTPATIENT)
Age: 42
End: 2022-10-28

## 2022-11-06 ENCOUNTER — TRANSCRIPTION ENCOUNTER (OUTPATIENT)
Age: 42
End: 2022-11-06

## 2022-11-29 NOTE — HISTORY OF PRESENT ILLNESS
stable. [Upper-Rt-Q] : upper right quadrant [Lower-Rt-Q] : lower right quadrant [Sharp] : sharp [Continuous] : continuous [Normal Amount/Duration] : was of a normal amount and duration [Spotting Between  Menses] : no spotting between menses [Regular Cycle Intervals] : periods have been regular [Prior Menses:  ___ Date] : date of prior menstruation was [unfilled]

## 2022-12-12 ENCOUNTER — APPOINTMENT (OUTPATIENT)
Dept: ENDOCRINOLOGY | Facility: CLINIC | Age: 42
End: 2022-12-12

## 2022-12-12 VITALS
SYSTOLIC BLOOD PRESSURE: 125 MMHG | HEART RATE: 81 BPM | DIASTOLIC BLOOD PRESSURE: 85 MMHG | OXYGEN SATURATION: 97 % | TEMPERATURE: 98 F | BODY MASS INDEX: 20.32 KG/M2 | HEIGHT: 64 IN | WEIGHT: 119 LBS | RESPIRATION RATE: 18 BRPM

## 2022-12-12 PROCEDURE — 99213 OFFICE O/P EST LOW 20 MIN: CPT

## 2022-12-12 RX ORDER — CYCLOBENZAPRINE HYDROCHLORIDE 10 MG/1
10 TABLET, FILM COATED ORAL
Qty: 21 | Refills: 0 | Status: DISCONTINUED | COMMUNITY
Start: 2022-10-20

## 2022-12-12 RX ORDER — OXYCODONE 5 MG/1
5 TABLET ORAL
Qty: 12 | Refills: 0 | Status: DISCONTINUED | COMMUNITY
Start: 2022-10-20

## 2022-12-12 RX ORDER — CEPHALEXIN 500 MG/1
500 CAPSULE ORAL
Qty: 20 | Refills: 0 | Status: DISCONTINUED | COMMUNITY
Start: 2022-10-20

## 2022-12-12 NOTE — DATA REVIEWED
[FreeTextEntry1] : Labs 10/2022:\par TSH 0.55\par HCG neg.\par Vit D 60.6\par A1c 5.4%\par \par Labs 11/2021:\par TSH 1.42\par Free T4 1.5\par \par Chol 197\par HDL 63\par \par CMP normal\par \par \par Labs 4/2021:\par TSH 4.71\par Free T4 1.6\par Vit D 58.6\par Testosterone 23.7\par CMP normal

## 2022-12-12 NOTE — ASSESSMENT
[FreeTextEntry1] : 43 yo F with Hashimoto's thyroiditis clinically euthyroid on exam. Chemically euthyroid on recent TFTs on Synthroid 100 mcg daily. Can repeat with next lab work. Will adjust further as needed. Can follow-up in 12 months if chemically euthyroid. \par

## 2022-12-12 NOTE — REVIEW OF SYSTEMS
[Fatigue] : fatigue [Recent Weight Loss (___ Lbs)] : recent weight loss: [unfilled] lbs [All other systems negative] : All other systems negative [Recent Weight Gain (___ Lbs)] : no recent weight gain [Visual Field Defect] : no visual field defect [Dysphagia] : no dysphagia [Neck Pain] : no neck pain [Dysphonia] : no dysphonia [Palpitations] : no palpitations [Shortness Of Breath] : no shortness of breath [Cough] : cough [Nausea] : no nausea [Vomiting] : no vomiting [Polyuria] : no polyuria [Myalgia] : myalgia  [Cold Intolerance] : no cold intolerance [Heat Intolerance] : no heat intolerance [FreeTextEntry7] : +crohns

## 2022-12-12 NOTE — HISTORY OF PRESENT ILLNESS
[FreeTextEntry1] : 43 yo F with PMH Hashimoto's thyroiditis, Crohn's disease\par \par Prior Endocrinologist: Dr Charles\par diagnosed with hypothyroidism 2013 in the setting of hair loss\par she is on Synthroid (brand) 100 mcg qd. changed to brand due to hair loss. Has been clinically and chemically euthyroid on this dose. \par Sister with hypothyroidism\par She denies history of radiation to the head or neck. \par She denies history of amiodarone or lithium use.\par No recent steroids \par reports history of thyroid nodule - reportedly benign FNA\par thyroid US 1/13/20 did not have thyroid nodules\par reports hair loss since the beginning of COVID\par Presents today with fevers, chills, cough, congestion x 2-3 days. \par \par \par

## 2022-12-22 ENCOUNTER — TRANSCRIPTION ENCOUNTER (OUTPATIENT)
Age: 42
End: 2022-12-22

## 2023-01-09 ENCOUNTER — APPOINTMENT (OUTPATIENT)
Dept: GASTROENTEROLOGY | Facility: CLINIC | Age: 43
End: 2023-01-09

## 2023-01-27 NOTE — DIETITIAN INITIAL EVALUATION ADULT. - +GENDER
What Is The Reason For Today's Visit?: Full Body Skin Examination
What Is The Reason For Today's Visit? (Being Monitored For X): concerning skin lesions on an annual basis
Additional History: Patient states the spot on the right neck been there a couple of months. It has not grown or change color. He catches it on his razor.\\n\\nThree spots on the left side of neck have been there last couple of months. They don’t bother him but he worries about them. \\n\\nHe has a spot where his glasses sit on his face that is red. It does not bother him right now but wants to make sure that they are not anything to worry about. \\n\\nHe has another spot on the right lateral lower back that has been there a few years. This is not bothering him, he would just like it looked at.
Statement Selected

## 2023-01-30 ENCOUNTER — RX RENEWAL (OUTPATIENT)
Age: 43
End: 2023-01-30

## 2023-02-07 ENCOUNTER — APPOINTMENT (OUTPATIENT)
Dept: OBGYN | Facility: CLINIC | Age: 43
End: 2023-02-07
Payer: COMMERCIAL

## 2023-02-07 VITALS
HEIGHT: 64 IN | WEIGHT: 124.6 LBS | SYSTOLIC BLOOD PRESSURE: 113 MMHG | DIASTOLIC BLOOD PRESSURE: 75 MMHG | HEART RATE: 66 BPM | BODY MASS INDEX: 21.27 KG/M2

## 2023-02-07 PROCEDURE — 99396 PREV VISIT EST AGE 40-64: CPT

## 2023-02-07 RX ORDER — NORETHINDRONE ACETATE AND ETHINYL ESTRADIOL AND FERROUS FUMARATE 1.5-30(21)
1.5-3 KIT ORAL
Qty: 84 | Refills: 3 | Status: COMPLETED | COMMUNITY
Start: 2021-01-20 | End: 2023-02-07

## 2023-02-08 LAB — HPV HIGH+LOW RISK DNA PNL CVX: NOT DETECTED

## 2023-02-12 LAB — CYTOLOGY CVX/VAG DOC THIN PREP: NORMAL

## 2023-02-28 ENCOUNTER — RX RENEWAL (OUTPATIENT)
Age: 43
End: 2023-02-28

## 2023-03-30 ENCOUNTER — TRANSCRIPTION ENCOUNTER (OUTPATIENT)
Age: 43
End: 2023-03-30

## 2023-03-30 LAB
25(OH)D3 SERPL-MCNC: 52 NG/ML
CRP SERPL-MCNC: 7 MG/L
ERYTHROCYTE [SEDIMENTATION RATE] IN BLOOD BY WESTERGREN METHOD: 10 MM/HR
HBV CORE IGM SER QL: NONREACTIVE
HBV SURFACE AB SER QL: REACTIVE
HBV SURFACE AG SER QL: NONREACTIVE

## 2023-04-07 ENCOUNTER — TRANSCRIPTION ENCOUNTER (OUTPATIENT)
Age: 43
End: 2023-04-07

## 2023-04-07 LAB
ANTIBODIES TO ADALIMUMAB (ATA) CONCENTRATION: < 1.7 U/ML
PROMETHEUS ANSER ADA: NORMAL
PROMETHEUS LABORATORY FOOTER: NORMAL
SERUM ADALIMUMAB (ADA) CONCENTRATION: 20.2 UG/ML

## 2023-04-18 ENCOUNTER — APPOINTMENT (OUTPATIENT)
Dept: DERMATOLOGY | Facility: CLINIC | Age: 43
End: 2023-04-18
Payer: COMMERCIAL

## 2023-04-18 VITALS — HEIGHT: 64 IN | WEIGHT: 125 LBS | BODY MASS INDEX: 21.34 KG/M2

## 2023-04-18 DIAGNOSIS — Z12.83 ENCOUNTER FOR SCREENING FOR MALIGNANT NEOPLASM OF SKIN: ICD-10-CM

## 2023-04-18 DIAGNOSIS — L73.0 ACNE KELOID: ICD-10-CM

## 2023-04-18 DIAGNOSIS — D23.9 OTHER BENIGN NEOPLASM OF SKIN, UNSPECIFIED: ICD-10-CM

## 2023-04-18 DIAGNOSIS — D22.9 MELANOCYTIC NEVI, UNSPECIFIED: ICD-10-CM

## 2023-04-18 PROCEDURE — 99203 OFFICE O/P NEW LOW 30 MIN: CPT

## 2023-04-27 ENCOUNTER — NON-APPOINTMENT (OUTPATIENT)
Age: 43
End: 2023-04-27

## 2023-05-01 ENCOUNTER — NON-APPOINTMENT (OUTPATIENT)
Age: 43
End: 2023-05-01

## 2023-05-09 ENCOUNTER — APPOINTMENT (OUTPATIENT)
Dept: MRI IMAGING | Facility: CLINIC | Age: 43
End: 2023-05-09

## 2023-05-10 ENCOUNTER — APPOINTMENT (OUTPATIENT)
Dept: MRI IMAGING | Facility: CLINIC | Age: 43
End: 2023-05-10
Payer: COMMERCIAL

## 2023-05-10 PROCEDURE — 72197 MRI PELVIS W/O & W/DYE: CPT

## 2023-05-10 PROCEDURE — A9585: CPT | Mod: JW

## 2023-05-16 ENCOUNTER — APPOINTMENT (OUTPATIENT)
Dept: INTERVENTIONAL RADIOLOGY/VASCULAR | Facility: CLINIC | Age: 43
End: 2023-05-16
Payer: COMMERCIAL

## 2023-05-16 DIAGNOSIS — Z87.448 PERSONAL HISTORY OF OTHER DISEASES OF URINARY SYSTEM: ICD-10-CM

## 2023-05-16 DIAGNOSIS — R93.5 ABNORMAL FINDINGS ON DIAGNOSTIC IMAGING OF OTHER ABDOMINAL REGIONS, INCLUDING RETROPERITONEUM: ICD-10-CM

## 2023-05-16 DIAGNOSIS — R35.0 FREQUENCY OF MICTURITION: ICD-10-CM

## 2023-05-16 DIAGNOSIS — R39.15 URGENCY OF URINATION: ICD-10-CM

## 2023-05-16 DIAGNOSIS — N90.89 OTHER SPECIFIED NONINFLAMMATORY DISORDERS OF VULVA AND PERINEUM: ICD-10-CM

## 2023-05-16 DIAGNOSIS — R35.1 NOCTURIA: ICD-10-CM

## 2023-05-16 DIAGNOSIS — Z63.5 DISRUPTION OF FAMILY BY SEPARATION AND DIVORCE: ICD-10-CM

## 2023-05-16 PROCEDURE — 99204 OFFICE O/P NEW MOD 45 MIN: CPT | Mod: 95

## 2023-05-16 RX ORDER — MULTIVITAMIN
TABLET ORAL DAILY
Qty: 90 | Refills: 0 | Status: DISCONTINUED | COMMUNITY
Start: 2019-10-22 | End: 2023-05-16

## 2023-05-16 SDOH — SOCIAL STABILITY - SOCIAL INSECURITY: DISRUPTION OF FAMILY BY SEPARATION AND DIVORCE: Z63.5

## 2023-05-23 ENCOUNTER — APPOINTMENT (OUTPATIENT)
Dept: GASTROENTEROLOGY | Facility: CLINIC | Age: 43
End: 2023-05-23
Payer: COMMERCIAL

## 2023-05-23 VITALS
OXYGEN SATURATION: 97 % | HEIGHT: 64 IN | SYSTOLIC BLOOD PRESSURE: 102 MMHG | TEMPERATURE: 97.4 F | RESPIRATION RATE: 16 BRPM | DIASTOLIC BLOOD PRESSURE: 60 MMHG | WEIGHT: 125 LBS | HEART RATE: 67 BPM | BODY MASS INDEX: 21.34 KG/M2

## 2023-05-23 PROCEDURE — 99215 OFFICE O/P EST HI 40 MIN: CPT

## 2023-05-23 NOTE — ASSESSMENT
[FreeTextEntry1] : 43 F CD with TI stricture, hypothyroidism, anorexia as teen, here in follow-up.  Other medical problems include acne, anxiety with depression, ASCUS with positive high-risk HPV, chronic sinus complaints, dermatofibroma, eustachian tube dysfunction, deviated nasal septum, fibroid uterus, Hashimoto's thyroiditis, high cholesterol, migraines.  There is a family history of gastric cancer.\par She had a small bowel obstruction which required TI resection by Procaccino in 2020 and is currently doing well on Humira apart from the fact that she is at times pellet sized bowel movements (this is in spite of copious water and fiber) which are very hard to expel.

## 2023-05-23 NOTE — HISTORY OF PRESENT ILLNESS
[FreeTextEntry1] : 43 F CD with TI stricture, hypothyroidism, anorexia as teen, here in follow-up\par \par Initial dx:2015\par If CD: stricturing\par Presentation:SBO\par Meds failed: remicade (too hard to make appts)\par Current meds:humira, q2w\par Last date administered:5/16/23\par Last level checked: April 2023 drug level 20.2 antibody level less than 1.7\par Last colonoscopy:'21 [place]\par Normal, though status postsurgical changes\par TI biopsy unremarkable mall bowel mucosa; biopsies at 60, 40, 20 unremarkable colonic mucosa\par Last imaging: \par 2023 MR pelvis uterine fibroid scan feel it when she lies down, urinates often)\par 2022 MR abdomen\par No inflammation\par \par Surgery:2/2 SBO\par Procaccino colon resection - 6", appendix 6/2020\par \par BM/D:0-1\par regular for a long time, more recently v irregular\par 3 d with laxatives to go\par clear diarrhea - \par distended in between though not really painful\par diet same, consistent\par drinks water round the clock\par \par BM/night:not usually\par constipation/diarrhea\par Blood:yes\par Mucus:less than prior\par Weight loss:stable (125 lb)\par Fatigue:too much, doesn't sleep well\par \par Joint aches:hands really bad; thinks tomatoes might trigger - no rel with HUmira\par needs to see Rheum - has appointment in Oct\par mvi for low residue diet --stopped mvi because of iron being high\par donates blood regularly, eats a lot of meat\par \par Feb (away) diarrhea for 5 d straight- like water, control \par \par Skin issues:no\par Eye issues:no\par \par TB:?  Through work\par HBV: - ve March 2023\par \par March 2023 labs:\par ESR normal 10, normal under 15\par CRP mildly elevated at 7 normal less than 4; last 2 CRP values November 2020 and October 2015 both normal in the system\par Normal vitamin D\par \par

## 2023-05-24 ENCOUNTER — TRANSCRIPTION ENCOUNTER (OUTPATIENT)
Age: 43
End: 2023-05-24

## 2023-05-30 ENCOUNTER — TRANSCRIPTION ENCOUNTER (OUTPATIENT)
Age: 43
End: 2023-05-30

## 2023-06-01 ENCOUNTER — TRANSCRIPTION ENCOUNTER (OUTPATIENT)
Age: 43
End: 2023-06-01

## 2023-06-02 ENCOUNTER — NON-APPOINTMENT (OUTPATIENT)
Age: 43
End: 2023-06-02

## 2023-06-02 ENCOUNTER — APPOINTMENT (OUTPATIENT)
Dept: GASTROENTEROLOGY | Facility: CLINIC | Age: 43
End: 2023-06-02
Payer: COMMERCIAL

## 2023-06-02 DIAGNOSIS — M62.89 OTHER SPECIFIED DISORDERS OF MUSCLE: ICD-10-CM

## 2023-06-02 DIAGNOSIS — R19.7 DIARRHEA, UNSPECIFIED: ICD-10-CM

## 2023-06-02 PROCEDURE — 99441: CPT

## 2023-06-05 ENCOUNTER — TRANSCRIPTION ENCOUNTER (OUTPATIENT)
Age: 43
End: 2023-06-05

## 2023-06-05 LAB
T4 SERPL-MCNC: 8.1 UG/DL
TSH SERPL-ACNC: 2.3 UIU/ML

## 2023-06-16 ENCOUNTER — RESULT REVIEW (OUTPATIENT)
Age: 43
End: 2023-06-16

## 2023-06-16 ENCOUNTER — APPOINTMENT (OUTPATIENT)
Dept: MAMMOGRAPHY | Facility: CLINIC | Age: 43
End: 2023-06-16
Payer: COMMERCIAL

## 2023-06-16 ENCOUNTER — APPOINTMENT (OUTPATIENT)
Dept: ULTRASOUND IMAGING | Facility: CLINIC | Age: 43
End: 2023-06-16
Payer: COMMERCIAL

## 2023-06-16 PROCEDURE — 77063 BREAST TOMOSYNTHESIS BI: CPT

## 2023-06-16 PROCEDURE — 77067 SCR MAMMO BI INCL CAD: CPT

## 2023-06-16 PROCEDURE — 76641 ULTRASOUND BREAST COMPLETE: CPT | Mod: 50

## 2023-06-23 DIAGNOSIS — N63.20 UNSPECIFIED LUMP IN THE LEFT BREAST, UNSPECIFIED QUADRANT: ICD-10-CM

## 2023-06-27 ENCOUNTER — TRANSCRIPTION ENCOUNTER (OUTPATIENT)
Age: 43
End: 2023-06-27

## 2023-06-28 ENCOUNTER — RESULT REVIEW (OUTPATIENT)
Age: 43
End: 2023-06-28

## 2023-06-28 ENCOUNTER — OUTPATIENT (OUTPATIENT)
Dept: OUTPATIENT SERVICES | Facility: HOSPITAL | Age: 43
LOS: 1 days | End: 2023-06-28
Payer: COMMERCIAL

## 2023-06-28 ENCOUNTER — APPOINTMENT (OUTPATIENT)
Dept: GASTROENTEROLOGY | Facility: GI CENTER | Age: 43
End: 2023-06-28
Payer: COMMERCIAL

## 2023-06-28 DIAGNOSIS — K50.00 CROHN'S DISEASE OF SMALL INTESTINE WITHOUT COMPLICATIONS: ICD-10-CM

## 2023-06-28 DIAGNOSIS — K59.00 CONSTIPATION, UNSPECIFIED: ICD-10-CM

## 2023-06-28 DIAGNOSIS — Z98.890 OTHER SPECIFIED POSTPROCEDURAL STATES: Chronic | ICD-10-CM

## 2023-06-28 DIAGNOSIS — K50.00 CROHN'S DISEASE OF SMALL INTESTINE W/OUT COMPLICATIONS: ICD-10-CM

## 2023-06-28 DIAGNOSIS — Z98.89 OTHER SPECIFIED POSTPROCEDURAL STATES: Chronic | ICD-10-CM

## 2023-06-28 PROCEDURE — 88305 TISSUE EXAM BY PATHOLOGIST: CPT

## 2023-06-28 PROCEDURE — 45380 COLONOSCOPY AND BIOPSY: CPT

## 2023-06-28 PROCEDURE — 88305 TISSUE EXAM BY PATHOLOGIST: CPT | Mod: 26

## 2023-06-30 LAB — SURGICAL PATHOLOGY STUDY: SIGNIFICANT CHANGE UP

## 2023-07-03 ENCOUNTER — TRANSCRIPTION ENCOUNTER (OUTPATIENT)
Age: 43
End: 2023-07-03

## 2023-07-19 ENCOUNTER — APPOINTMENT (OUTPATIENT)
Dept: RADIOLOGY | Facility: CLINIC | Age: 43
End: 2023-07-19
Payer: COMMERCIAL

## 2023-07-19 PROCEDURE — 74018 RADEX ABDOMEN 1 VIEW: CPT

## 2023-07-25 ENCOUNTER — TRANSCRIPTION ENCOUNTER (OUTPATIENT)
Age: 43
End: 2023-07-25

## 2023-08-08 ENCOUNTER — APPOINTMENT (OUTPATIENT)
Dept: OBGYN | Facility: CLINIC | Age: 43
End: 2023-08-08
Payer: COMMERCIAL

## 2023-08-08 VITALS
RESPIRATION RATE: 18 BRPM | WEIGHT: 124 LBS | HEART RATE: 68 BPM | DIASTOLIC BLOOD PRESSURE: 68 MMHG | SYSTOLIC BLOOD PRESSURE: 114 MMHG | BODY MASS INDEX: 21.17 KG/M2 | HEIGHT: 64 IN

## 2023-08-08 PROCEDURE — 99214 OFFICE O/P EST MOD 30 MIN: CPT | Mod: 57

## 2023-08-11 ENCOUNTER — TRANSCRIPTION ENCOUNTER (OUTPATIENT)
Age: 43
End: 2023-08-11

## 2023-08-14 NOTE — HISTORY OF PRESENT ILLNESS
[FreeTextEntry1] : 44 YO  WITH A FIBROID UTERUS FOR SURGICAL CONSULTATION. PT NOTES SWELLING AND PAIN AND WAS NOTED ON IMAGING TO HAVE A LARGE UTERINE MYOMA. PT HAS A SIGNIFICANT HX OF CROHNS DISEASE WITH A BOWEL RESECTION IN THE PAST. PT DECLINES HYSTERECTOMY DESPITE HAVING A TUBAL LIGATION.

## 2023-08-14 NOTE — PLAN
[FreeTextEntry1] : PT PRESENTS FOR CONSULT REGARDING UTERINE FIBROIDS. FINDINGS ON IMAGING STUDIES REVIEWED. ILLUSTRATION OF THE MYOMATA AND LOCATION GIVEN. TREATMENT OPTIONS DISCUSSED INCLUDING NON SURGERY AND SURGICAL OPTIONS.WE DISCUSSED UTERINE ARTERY EMBOLIZATION AS WELL AS GNRH SUPPRESSION WITH ADD BACK THERAPY.  MYOMECTOMY VS HYSTERECTOMY DISCUSSED. ALL QUESTIONS ASKED AND ANSWERED. PT STRONGLY DESIRES UTERINE PRESERVATION. WILL SCHEDULE MYOMECTOMY.

## 2023-08-14 NOTE — PHYSICAL EXAM
[Chaperone Declined] : Patient declined chaperone [Appropriately responsive] : appropriately responsive [Alert] : alert [No Acute Distress] : no acute distress [Soft] : soft [Non-tender] : non-tender [Non-distended] : non-distended [No HSM] : No HSM [Oriented x3] : oriented x3 [FreeTextEntry7] : myoma palpable

## 2023-08-21 ENCOUNTER — RX RENEWAL (OUTPATIENT)
Age: 43
End: 2023-08-21

## 2023-08-21 RX ORDER — ADALIMUMAB 40MG/0.4ML
KIT SUBCUTANEOUS
Qty: 2 | Refills: 11 | Status: ACTIVE | COMMUNITY
Start: 1900-01-01 | End: 1900-01-01

## 2023-09-27 ENCOUNTER — TRANSCRIPTION ENCOUNTER (OUTPATIENT)
Age: 43
End: 2023-09-27

## 2023-09-27 ENCOUNTER — APPOINTMENT (OUTPATIENT)
Dept: GASTROENTEROLOGY | Facility: HOSPITAL | Age: 43
End: 2023-09-27

## 2023-09-27 ENCOUNTER — OUTPATIENT (OUTPATIENT)
Dept: OUTPATIENT SERVICES | Facility: HOSPITAL | Age: 43
LOS: 1 days | End: 2023-09-27
Payer: COMMERCIAL

## 2023-09-27 VITALS
HEART RATE: 81 BPM | SYSTOLIC BLOOD PRESSURE: 108 MMHG | TEMPERATURE: 98 F | DIASTOLIC BLOOD PRESSURE: 75 MMHG | OXYGEN SATURATION: 100 % | HEIGHT: 64 IN | WEIGHT: 125 LBS | RESPIRATION RATE: 20 BRPM

## 2023-09-27 DIAGNOSIS — M62.89 OTHER SPECIFIED DISORDERS OF MUSCLE: ICD-10-CM

## 2023-09-27 DIAGNOSIS — Z98.89 OTHER SPECIFIED POSTPROCEDURAL STATES: Chronic | ICD-10-CM

## 2023-09-27 DIAGNOSIS — Z90.49 ACQUIRED ABSENCE OF OTHER SPECIFIED PARTS OF DIGESTIVE TRACT: Chronic | ICD-10-CM

## 2023-09-27 DIAGNOSIS — Z98.890 OTHER SPECIFIED POSTPROCEDURAL STATES: Chronic | ICD-10-CM

## 2023-09-27 PROCEDURE — 91122: CPT

## 2023-09-27 PROCEDURE — 91122 ANORECTAL MANOMETRY: CPT | Mod: 26

## 2023-09-27 PROCEDURE — 91120: CPT | Mod: 26

## 2023-09-27 NOTE — ASU PREOP CHECKLIST - BP NONINVASIVE SYSTOLIC (MM HG)
[facility] requests orders to resume SN starting next week and PT/OT- gave verbal orders.  Jasmin Rivero RN    108

## 2023-09-27 NOTE — PRE PROCEDURE NOTE - PRE PROCEDURE EVALUATION
Attending Physician:              Phill Álvarez MD MSEd    Indication for Procedure:     Pelvic floor Dysfunction                PAST MEDICAL & SURGICAL HISTORY:  Hypothyroid      Crohn disease      Migraines      H/O  section  with tubal ligation, 2015      H/O carpal tunnel repair  left-       History of colon resection            See Allscripts Note for further details  ALLERGIES:  No Known Allergies    HOME MEDICATIONS:  acetaminophen 500 mg oral tablet: 2 tab(s) orally every 6 hours  Humira 40 mg/0.8 mL subcutaneous kit: 40 milligram(s) subcutaneously every other week  Loestrin 21 1.5/30 oral tablet: 1 tab(s) orally once a day    Note: Pt has home OCP with them  Synthroid 100 mcg (0.1 mg) oral tablet: 1 tab(s) orally once a day      See Allscripts Note for further details    AICD/PPM: [ ] yes   [x ] no    PERTINENT LAB DATA:                      PHYSICAL EXAMINATION:    Height (cm): 162.6  Weight (kg): 56.7  BMI (kg/m2): 21.4  BSA (m2): 1.6T(C): 36.9  HR: 81  BP: 108/75  RR: 20  SpO2: 100%    Constitutional: NAD  HEENT: PERRLA, EOMI,    Neck:  No JVD  Respiratory: CTAB/L  Cardiovascular: S1 and S2  Gastrointestinal: BS+, soft, NT/ND  Extremities: No peripheral edema  Neurological: A/O x 3, no focal deficits  Psychiatric: Normal mood, normal affect  Skin: No rashes    ASA Class: I [ ]  II [ x]  III [ ]  IV [ ]    COMMENTS:    The patient is a suitable candidate for the planned procedure unless box checked [ ]  No, explain:

## 2023-09-27 NOTE — ASU PATIENT PROFILE, ADULT - NSICDXPASTSURGICALHX_GEN_ALL_CORE_FT
PAST SURGICAL HISTORY:  H/O carpal tunnel repair left- 2015    H/O  section with tubal ligation, 2015    History of colon resection

## 2023-09-28 ENCOUNTER — TRANSCRIPTION ENCOUNTER (OUTPATIENT)
Age: 43
End: 2023-09-28

## 2023-10-03 ENCOUNTER — TRANSCRIPTION ENCOUNTER (OUTPATIENT)
Age: 43
End: 2023-10-03

## 2023-10-04 ENCOUNTER — NON-APPOINTMENT (OUTPATIENT)
Age: 43
End: 2023-10-04

## 2023-10-04 ENCOUNTER — APPOINTMENT (OUTPATIENT)
Dept: INTERNAL MEDICINE | Facility: CLINIC | Age: 43
End: 2023-10-04
Payer: COMMERCIAL

## 2023-10-04 VITALS
WEIGHT: 122 LBS | DIASTOLIC BLOOD PRESSURE: 74 MMHG | SYSTOLIC BLOOD PRESSURE: 105 MMHG | TEMPERATURE: 98.7 F | HEIGHT: 64 IN | OXYGEN SATURATION: 100 % | RESPIRATION RATE: 16 BRPM | HEART RATE: 70 BPM | BODY MASS INDEX: 20.83 KG/M2

## 2023-10-04 DIAGNOSIS — M25.50 PAIN IN UNSPECIFIED JOINT: ICD-10-CM

## 2023-10-04 DIAGNOSIS — R53.83 OTHER FATIGUE: ICD-10-CM

## 2023-10-04 PROCEDURE — 93000 ELECTROCARDIOGRAM COMPLETE: CPT

## 2023-10-04 PROCEDURE — 99214 OFFICE O/P EST MOD 30 MIN: CPT | Mod: 25

## 2023-10-04 PROCEDURE — 36415 COLL VENOUS BLD VENIPUNCTURE: CPT

## 2023-10-05 ENCOUNTER — NON-APPOINTMENT (OUTPATIENT)
Age: 43
End: 2023-10-05

## 2023-10-05 LAB
25(OH)D3 SERPL-MCNC: 54.8 NG/ML
ALBUMIN SERPL ELPH-MCNC: 4.5 G/DL
ALP BLD-CCNC: 57 U/L
ALT SERPL-CCNC: 19 U/L
ANION GAP SERPL CALC-SCNC: 12 MMOL/L
APPEARANCE: CLEAR
AST SERPL-CCNC: 33 U/L
BACTERIA: NEGATIVE /HPF
BILIRUB SERPL-MCNC: 0.5 MG/DL
BILIRUBIN URINE: NEGATIVE
BLOOD URINE: NEGATIVE
BUN SERPL-MCNC: 12 MG/DL
CALCIUM SERPL-MCNC: 9.7 MG/DL
CAST: 0 /LPF
CHLORIDE SERPL-SCNC: 102 MMOL/L
CHOLEST SERPL-MCNC: 166 MG/DL
CO2 SERPL-SCNC: 25 MMOL/L
COLOR: YELLOW
CREAT SERPL-MCNC: 0.78 MG/DL
CRP SERPL-MCNC: <3 MG/L
EGFR: 97 ML/MIN/1.73M2
EPITHELIAL CELLS: 7 /HPF
ERYTHROCYTE [SEDIMENTATION RATE] IN BLOOD BY WESTERGREN METHOD: 16 MM/HR
ESTIMATED AVERAGE GLUCOSE: 108 MG/DL
FOLATE SERPL-MCNC: 19.5 NG/ML
GLUCOSE QUALITATIVE U: NEGATIVE MG/DL
GLUCOSE SERPL-MCNC: 89 MG/DL
HBA1C MFR BLD HPLC: 5.4 %
HCT VFR BLD CALC: 39 %
HDLC SERPL-MCNC: 60 MG/DL
HGB BLD-MCNC: 12.2 G/DL
IRON SATN MFR SERPL: 12 %
IRON SERPL-MCNC: 51 UG/DL
KETONES URINE: NEGATIVE MG/DL
LDLC SERPL CALC-MCNC: 88 MG/DL
LEUKOCYTE ESTERASE URINE: NEGATIVE
MCHC RBC-ENTMCNC: 28.5 PG
MCHC RBC-ENTMCNC: 31.3 GM/DL
MCV RBC AUTO: 91.1 FL
MICROSCOPIC-UA: NORMAL
NITRITE URINE: NEGATIVE
NONHDLC SERPL-MCNC: 106 MG/DL
PH URINE: 7
PLATELET # BLD AUTO: 235 K/UL
POTASSIUM SERPL-SCNC: 4.3 MMOL/L
PROT SERPL-MCNC: 7.1 G/DL
PROTEIN URINE: NEGATIVE MG/DL
RBC # BLD: 4.28 M/UL
RBC # FLD: 13.1 %
RED BLOOD CELLS URINE: 0 /HPF
SODIUM SERPL-SCNC: 139 MMOL/L
SPECIFIC GRAVITY URINE: 1.01
TIBC SERPL-MCNC: 432 UG/DL
TRIGL SERPL-MCNC: 102 MG/DL
TSH SERPL-ACNC: 0.47 UIU/ML
UIBC SERPL-MCNC: 380 UG/DL
UROBILINOGEN URINE: 0.2 MG/DL
VIT B12 SERPL-MCNC: 317 PG/ML
WBC # FLD AUTO: 4.84 K/UL
WHITE BLOOD CELLS URINE: 1 /HPF

## 2023-10-07 ENCOUNTER — NON-APPOINTMENT (OUTPATIENT)
Age: 43
End: 2023-10-07

## 2023-10-09 ENCOUNTER — APPOINTMENT (OUTPATIENT)
Dept: OBGYN | Facility: CLINIC | Age: 43
End: 2023-10-09
Payer: COMMERCIAL

## 2023-10-09 VITALS
WEIGHT: 122 LBS | DIASTOLIC BLOOD PRESSURE: 85 MMHG | HEIGHT: 64 IN | BODY MASS INDEX: 20.83 KG/M2 | SYSTOLIC BLOOD PRESSURE: 137 MMHG

## 2023-10-09 DIAGNOSIS — N64.4 MASTODYNIA: ICD-10-CM

## 2023-10-09 PROCEDURE — 99212 OFFICE O/P EST SF 10 MIN: CPT

## 2023-10-11 ENCOUNTER — APPOINTMENT (OUTPATIENT)
Dept: OBGYN | Facility: CLINIC | Age: 43
End: 2023-10-11
Payer: COMMERCIAL

## 2023-10-11 PROCEDURE — 99442: CPT

## 2023-10-12 ENCOUNTER — APPOINTMENT (OUTPATIENT)
Dept: MRI IMAGING | Facility: CLINIC | Age: 43
End: 2023-10-12
Payer: COMMERCIAL

## 2023-10-12 PROCEDURE — A9585: CPT

## 2023-10-12 PROCEDURE — 77049 MRI BREAST C-+ W/CAD BI: CPT

## 2023-10-16 ENCOUNTER — OUTPATIENT (OUTPATIENT)
Dept: OUTPATIENT SERVICES | Facility: HOSPITAL | Age: 43
LOS: 1 days | End: 2023-10-16
Payer: COMMERCIAL

## 2023-10-16 VITALS
RESPIRATION RATE: 16 BRPM | WEIGHT: 125.66 LBS | SYSTOLIC BLOOD PRESSURE: 97 MMHG | DIASTOLIC BLOOD PRESSURE: 70 MMHG | TEMPERATURE: 98 F | HEART RATE: 64 BPM | OXYGEN SATURATION: 100 % | HEIGHT: 64 IN

## 2023-10-16 DIAGNOSIS — Z98.51 TUBAL LIGATION STATUS: Chronic | ICD-10-CM

## 2023-10-16 DIAGNOSIS — Z90.49 ACQUIRED ABSENCE OF OTHER SPECIFIED PARTS OF DIGESTIVE TRACT: Chronic | ICD-10-CM

## 2023-10-16 DIAGNOSIS — D25.9 LEIOMYOMA OF UTERUS, UNSPECIFIED: ICD-10-CM

## 2023-10-16 DIAGNOSIS — Z98.89 OTHER SPECIFIED POSTPROCEDURAL STATES: Chronic | ICD-10-CM

## 2023-10-16 DIAGNOSIS — Z01.818 ENCOUNTER FOR OTHER PREPROCEDURAL EXAMINATION: ICD-10-CM

## 2023-10-16 DIAGNOSIS — Z98.82 BREAST IMPLANT STATUS: Chronic | ICD-10-CM

## 2023-10-16 DIAGNOSIS — Z98.890 OTHER SPECIFIED POSTPROCEDURAL STATES: Chronic | ICD-10-CM

## 2023-10-16 LAB
A1C WITH ESTIMATED AVERAGE GLUCOSE RESULT: 5.3 % — SIGNIFICANT CHANGE UP (ref 4–5.6)
ANION GAP SERPL CALC-SCNC: 6 MMOL/L — SIGNIFICANT CHANGE UP (ref 5–17)
APPEARANCE UR: CLEAR — SIGNIFICANT CHANGE UP
APTT BLD: 27.4 SEC — SIGNIFICANT CHANGE UP (ref 24.5–35.6)
BACTERIA # UR AUTO: NEGATIVE /HPF — SIGNIFICANT CHANGE UP
BASOPHILS # BLD AUTO: 0.04 K/UL — SIGNIFICANT CHANGE UP (ref 0–0.2)
BASOPHILS NFR BLD AUTO: 0.7 % — SIGNIFICANT CHANGE UP (ref 0–2)
BILIRUB UR-MCNC: NEGATIVE — SIGNIFICANT CHANGE UP
BLD GP AB SCN SERPL QL: SIGNIFICANT CHANGE UP
BUN SERPL-MCNC: 15 MG/DL — SIGNIFICANT CHANGE UP (ref 7–23)
CALCIUM SERPL-MCNC: 8.5 MG/DL — SIGNIFICANT CHANGE UP (ref 8.5–10.1)
CAST: 0 /LPF — SIGNIFICANT CHANGE UP (ref 0–4)
CHLORIDE SERPL-SCNC: 108 MMOL/L — SIGNIFICANT CHANGE UP (ref 96–108)
CO2 SERPL-SCNC: 24 MMOL/L — SIGNIFICANT CHANGE UP (ref 22–31)
COLOR SPEC: YELLOW — SIGNIFICANT CHANGE UP
CREAT SERPL-MCNC: 0.82 MG/DL — SIGNIFICANT CHANGE UP (ref 0.5–1.3)
DIFF PNL FLD: ABNORMAL
EGFR: 91 ML/MIN/1.73M2 — SIGNIFICANT CHANGE UP
EOSINOPHIL # BLD AUTO: 0.17 K/UL — SIGNIFICANT CHANGE UP (ref 0–0.5)
EOSINOPHIL NFR BLD AUTO: 3.2 % — SIGNIFICANT CHANGE UP (ref 0–6)
ESTIMATED AVERAGE GLUCOSE: 105 MG/DL — SIGNIFICANT CHANGE UP (ref 68–114)
GLUCOSE SERPL-MCNC: 98 MG/DL — SIGNIFICANT CHANGE UP (ref 70–99)
GLUCOSE UR QL: NEGATIVE MG/DL — SIGNIFICANT CHANGE UP
HCT VFR BLD CALC: 36.7 % — SIGNIFICANT CHANGE UP (ref 34.5–45)
HGB BLD-MCNC: 12 G/DL — SIGNIFICANT CHANGE UP (ref 11.5–15.5)
IMM GRANULOCYTES NFR BLD AUTO: 0.2 % — SIGNIFICANT CHANGE UP (ref 0–0.9)
INR BLD: 0.95 RATIO — SIGNIFICANT CHANGE UP (ref 0.85–1.18)
KETONES UR-MCNC: NEGATIVE MG/DL — SIGNIFICANT CHANGE UP
LEUKOCYTE ESTERASE UR-ACNC: NEGATIVE — SIGNIFICANT CHANGE UP
LYMPHOCYTES # BLD AUTO: 2.44 K/UL — SIGNIFICANT CHANGE UP (ref 1–3.3)
LYMPHOCYTES # BLD AUTO: 45.4 % — HIGH (ref 13–44)
MCHC RBC-ENTMCNC: 28.6 PG — SIGNIFICANT CHANGE UP (ref 27–34)
MCHC RBC-ENTMCNC: 32.7 GM/DL — SIGNIFICANT CHANGE UP (ref 32–36)
MCV RBC AUTO: 87.6 FL — SIGNIFICANT CHANGE UP (ref 80–100)
MONOCYTES # BLD AUTO: 0.38 K/UL — SIGNIFICANT CHANGE UP (ref 0–0.9)
MONOCYTES NFR BLD AUTO: 7.1 % — SIGNIFICANT CHANGE UP (ref 2–14)
NEUTROPHILS # BLD AUTO: 2.34 K/UL — SIGNIFICANT CHANGE UP (ref 1.8–7.4)
NEUTROPHILS NFR BLD AUTO: 43.4 % — SIGNIFICANT CHANGE UP (ref 43–77)
NITRITE UR-MCNC: NEGATIVE — SIGNIFICANT CHANGE UP
PH UR: 5.5 — SIGNIFICANT CHANGE UP (ref 5–8)
PLATELET # BLD AUTO: 368 K/UL — SIGNIFICANT CHANGE UP (ref 150–400)
POTASSIUM SERPL-MCNC: 3.6 MMOL/L — SIGNIFICANT CHANGE UP (ref 3.5–5.3)
POTASSIUM SERPL-SCNC: 3.6 MMOL/L — SIGNIFICANT CHANGE UP (ref 3.5–5.3)
PROT UR-MCNC: NEGATIVE MG/DL — SIGNIFICANT CHANGE UP
PROTHROM AB SERPL-ACNC: 10.7 SEC — SIGNIFICANT CHANGE UP (ref 9.5–13)
RBC # BLD: 4.19 M/UL — SIGNIFICANT CHANGE UP (ref 3.8–5.2)
RBC # FLD: 12.9 % — SIGNIFICANT CHANGE UP (ref 10.3–14.5)
RBC CASTS # UR COMP ASSIST: 7 /HPF — HIGH (ref 0–4)
SODIUM SERPL-SCNC: 138 MMOL/L — SIGNIFICANT CHANGE UP (ref 135–145)
SP GR SPEC: 1.02 — SIGNIFICANT CHANGE UP (ref 1–1.03)
SQUAMOUS # UR AUTO: 1 /HPF — SIGNIFICANT CHANGE UP (ref 0–5)
UROBILINOGEN FLD QL: 1 MG/DL — SIGNIFICANT CHANGE UP (ref 0.2–1)
WBC # BLD: 5.38 K/UL — SIGNIFICANT CHANGE UP (ref 3.8–10.5)
WBC # FLD AUTO: 5.38 K/UL — SIGNIFICANT CHANGE UP (ref 3.8–10.5)
WBC UR QL: 0 /HPF — SIGNIFICANT CHANGE UP (ref 0–5)

## 2023-10-16 PROCEDURE — 86901 BLOOD TYPING SEROLOGIC RH(D): CPT

## 2023-10-16 PROCEDURE — 99214 OFFICE O/P EST MOD 30 MIN: CPT | Mod: 25

## 2023-10-16 PROCEDURE — 80048 BASIC METABOLIC PNL TOTAL CA: CPT

## 2023-10-16 PROCEDURE — 86900 BLOOD TYPING SEROLOGIC ABO: CPT

## 2023-10-16 PROCEDURE — 85025 COMPLETE CBC W/AUTO DIFF WBC: CPT

## 2023-10-16 PROCEDURE — 86850 RBC ANTIBODY SCREEN: CPT

## 2023-10-16 PROCEDURE — 81001 URINALYSIS AUTO W/SCOPE: CPT

## 2023-10-16 PROCEDURE — 85730 THROMBOPLASTIN TIME PARTIAL: CPT

## 2023-10-16 PROCEDURE — 83036 HEMOGLOBIN GLYCOSYLATED A1C: CPT

## 2023-10-16 PROCEDURE — 85610 PROTHROMBIN TIME: CPT

## 2023-10-16 PROCEDURE — 36415 COLL VENOUS BLD VENIPUNCTURE: CPT

## 2023-10-16 RX ORDER — METRONIDAZOLE 500 MG
500 TABLET ORAL ONCE
Refills: 0 | Status: DISCONTINUED | OUTPATIENT
Start: 2023-10-25 | End: 2023-10-25

## 2023-10-16 RX ORDER — NORETHINDRONE AND ETHINYL ESTRADIOL 0.4-0.035
1 KIT ORAL
Qty: 0 | Refills: 0 | DISCHARGE

## 2023-10-16 NOTE — H&P PST ADULT - NSALCOHOLLASTUSE_GEN_A_CORE_DT
Eyes with no visual disturbances.  Ears clean and dry and no hearing difficulties. Nose with pink mucosa and no drainage.  Mouth mucous membranes moist and pink.  No tenderness or swelling to throat or neck. 15-Oct-2023

## 2023-10-16 NOTE — H&P PST ADULT - HISTORY OF PRESENT ILLNESS
[age] y.o WD, WN female presents to PST with hx of a fibroid. She states she has had the fibroid for years, monitored by her gyn. Her fibroid has been increasing in size over the years. She reports increased urinary frequency due to the fibroid. Her hx is significant for Crohn's and Hypothyroid. Patient has followed with her gyn, discussed options and now scheduled for a Robotic Myomectomy

## 2023-10-16 NOTE — H&P PST ADULT - ASSESSMENT
43 43 y.o female scheduled for a Robotic Myomectomy   Plan  1. Stop all NSAIDS, herbal supplements and vitamins for 7 days.  2. NPO at midnight.  3. Take the following medications Synthroid  with small sips of water on the morning of your procedure/surgery.  4. Use EZ sponges as directed  5. Labs as per surgeon  6. UCG STAT on admit  7. ERP orders placed  8. Preprocedure education provided

## 2023-10-16 NOTE — H&P PST ADULT - NSICDXPASTMEDICALHX_GEN_ALL_CORE_FT
PAST MEDICAL HISTORY:  Crohn disease     Cubital tunnel syndrome, left     Disordered sleep     Fibroid     History of carpal tunnel surgery of left wrist     Hypothyroid     Migraines     Neuropathy

## 2023-10-16 NOTE — H&P PST ADULT - NSICDXPASTSURGICALHX_GEN_ALL_CORE_FT
PAST SURGICAL HISTORY:  H/O breast augmentation     H/O carpal tunnel repair left- 2015    H/O  section with tubal ligation, 2015    H/O tubal ligation     History of colon resection

## 2023-10-17 ENCOUNTER — APPOINTMENT (OUTPATIENT)
Dept: ULTRASOUND IMAGING | Facility: CLINIC | Age: 43
End: 2023-10-17
Payer: COMMERCIAL

## 2023-10-17 ENCOUNTER — RESULT REVIEW (OUTPATIENT)
Age: 43
End: 2023-10-17

## 2023-10-17 DIAGNOSIS — D25.9 LEIOMYOMA OF UTERUS, UNSPECIFIED: ICD-10-CM

## 2023-10-17 DIAGNOSIS — Z01.818 ENCOUNTER FOR OTHER PREPROCEDURAL EXAMINATION: ICD-10-CM

## 2023-10-17 PROBLEM — G56.22 LESION OF ULNAR NERVE, LEFT UPPER LIMB: Chronic | Status: ACTIVE | Noted: 2023-10-16

## 2023-10-17 PROBLEM — D21.9 BENIGN NEOPLASM OF CONNECTIVE AND OTHER SOFT TISSUE, UNSPECIFIED: Chronic | Status: ACTIVE | Noted: 2023-10-16

## 2023-10-17 PROBLEM — G47.9 SLEEP DISORDER, UNSPECIFIED: Chronic | Status: ACTIVE | Noted: 2023-10-16

## 2023-10-17 PROBLEM — G62.9 POLYNEUROPATHY, UNSPECIFIED: Chronic | Status: ACTIVE | Noted: 2023-10-16

## 2023-10-17 PROBLEM — Z98.890 OTHER SPECIFIED POSTPROCEDURAL STATES: Chronic | Status: ACTIVE | Noted: 2023-10-16

## 2023-10-17 PROCEDURE — 76642 ULTRASOUND BREAST LIMITED: CPT | Mod: LT

## 2023-10-20 ENCOUNTER — APPOINTMENT (OUTPATIENT)
Dept: RHEUMATOLOGY | Facility: CLINIC | Age: 43
End: 2023-10-20
Payer: COMMERCIAL

## 2023-10-20 VITALS
BODY MASS INDEX: 21 KG/M2 | HEART RATE: 80 BPM | DIASTOLIC BLOOD PRESSURE: 75 MMHG | WEIGHT: 123 LBS | OXYGEN SATURATION: 99 % | SYSTOLIC BLOOD PRESSURE: 115 MMHG | HEIGHT: 64 IN

## 2023-10-20 DIAGNOSIS — F17.200 NICOTINE DEPENDENCE, UNSPECIFIED, UNCOMPLICATED: ICD-10-CM

## 2023-10-20 DIAGNOSIS — Z87.891 PERSONAL HISTORY OF NICOTINE DEPENDENCE: ICD-10-CM

## 2023-10-20 LAB
HCT VFR BLD CALC: 37.4 %
HGB BLD-MCNC: 11.8 G/DL
MCHC RBC-ENTMCNC: 28.9 PG
MCHC RBC-ENTMCNC: 31.6 GM/DL
MCV RBC AUTO: 91.4 FL
PLATELET # BLD AUTO: 356 K/UL
RBC # BLD: 4.09 M/UL
RBC # FLD: 13.3 %
RHEUMATOID FACT SER QL: <10 IU/ML
WBC # FLD AUTO: 5.73 K/UL

## 2023-10-20 PROCEDURE — 99205 OFFICE O/P NEW HI 60 MIN: CPT

## 2023-10-20 RX ORDER — SODIUM SULFATE, MAGNESIUM SULFATE, AND POTASSIUM CHLORIDE 17.75; 2.7; 2.25 G/1; G/1; G/1
1479-225-188 TABLET ORAL
Qty: 24 | Refills: 0 | Status: COMPLETED | COMMUNITY
Start: 2023-05-23 | End: 2023-10-20

## 2023-10-23 LAB
ALBUMIN SERPL ELPH-MCNC: 4.2 G/DL
ALP BLD-CCNC: 48 U/L
ALT SERPL-CCNC: 10 U/L
ANA SER IF-ACNC: NEGATIVE
ANION GAP SERPL CALC-SCNC: 12 MMOL/L
AST SERPL-CCNC: 19 U/L
BILIRUB SERPL-MCNC: 0.9 MG/DL
BUN SERPL-MCNC: 12 MG/DL
CALCIUM SERPL-MCNC: 9 MG/DL
CCP AB SER IA-ACNC: <8 UNITS
CHLORIDE SERPL-SCNC: 105 MMOL/L
CO2 SERPL-SCNC: 22 MMOL/L
CREAT SERPL-MCNC: 0.78 MG/DL
CRP SERPL-MCNC: <3 MG/L
EGFR: 97 ML/MIN/1.73M2
ERYTHROCYTE [SEDIMENTATION RATE] IN BLOOD BY WESTERGREN METHOD: 15 MM/HR
GLUCOSE SERPL-MCNC: 91 MG/DL
HBV CORE IGG+IGM SER QL: NONREACTIVE
HBV SURFACE AB SER QL: REACTIVE
HBV SURFACE AG SER QL: NONREACTIVE
HCV AB SER QL: NONREACTIVE
HCV S/CO RATIO: 0.1 S/CO
POTASSIUM SERPL-SCNC: 4.3 MMOL/L
PROT SERPL-MCNC: 6.8 G/DL
RF+CCP IGG SER-IMP: NEGATIVE
SODIUM SERPL-SCNC: 138 MMOL/L

## 2023-10-25 ENCOUNTER — APPOINTMENT (OUTPATIENT)
Dept: OBGYN | Facility: HOSPITAL | Age: 43
End: 2023-10-25

## 2023-10-25 ENCOUNTER — OUTPATIENT (OUTPATIENT)
Dept: INPATIENT UNIT | Facility: HOSPITAL | Age: 43
LOS: 1 days | Discharge: HOME | End: 2023-10-25
Payer: COMMERCIAL

## 2023-10-25 ENCOUNTER — RESULT REVIEW (OUTPATIENT)
Age: 43
End: 2023-10-25

## 2023-10-25 ENCOUNTER — TRANSCRIPTION ENCOUNTER (OUTPATIENT)
Age: 43
End: 2023-10-25

## 2023-10-25 VITALS
HEIGHT: 64 IN | DIASTOLIC BLOOD PRESSURE: 81 MMHG | OXYGEN SATURATION: 100 % | HEART RATE: 62 BPM | TEMPERATURE: 98 F | RESPIRATION RATE: 16 BRPM | SYSTOLIC BLOOD PRESSURE: 109 MMHG | WEIGHT: 123.02 LBS

## 2023-10-25 VITALS
SYSTOLIC BLOOD PRESSURE: 121 MMHG | RESPIRATION RATE: 16 BRPM | OXYGEN SATURATION: 100 % | HEART RATE: 93 BPM | TEMPERATURE: 99 F | DIASTOLIC BLOOD PRESSURE: 69 MMHG

## 2023-10-25 DIAGNOSIS — E03.9 HYPOTHYROIDISM, UNSPECIFIED: ICD-10-CM

## 2023-10-25 DIAGNOSIS — G62.9 POLYNEUROPATHY, UNSPECIFIED: ICD-10-CM

## 2023-10-25 DIAGNOSIS — K50.90 CROHN'S DISEASE, UNSPECIFIED, WITHOUT COMPLICATIONS: ICD-10-CM

## 2023-10-25 DIAGNOSIS — Z98.89 OTHER SPECIFIED POSTPROCEDURAL STATES: Chronic | ICD-10-CM

## 2023-10-25 DIAGNOSIS — D25.9 LEIOMYOMA OF UTERUS, UNSPECIFIED: ICD-10-CM

## 2023-10-25 DIAGNOSIS — Z90.49 ACQUIRED ABSENCE OF OTHER SPECIFIED PARTS OF DIGESTIVE TRACT: Chronic | ICD-10-CM

## 2023-10-25 DIAGNOSIS — Z98.82 BREAST IMPLANT STATUS: Chronic | ICD-10-CM

## 2023-10-25 DIAGNOSIS — Z98.51 TUBAL LIGATION STATUS: Chronic | ICD-10-CM

## 2023-10-25 DIAGNOSIS — Z98.890 OTHER SPECIFIED POSTPROCEDURAL STATES: Chronic | ICD-10-CM

## 2023-10-25 DIAGNOSIS — Z87.891 PERSONAL HISTORY OF NICOTINE DEPENDENCE: ICD-10-CM

## 2023-10-25 DIAGNOSIS — D25.1 INTRAMURAL LEIOMYOMA OF UTERUS: ICD-10-CM

## 2023-10-25 LAB
HCG UR QL: NEGATIVE — SIGNIFICANT CHANGE UP
HCG UR QL: NEGATIVE — SIGNIFICANT CHANGE UP

## 2023-10-25 PROCEDURE — 88305 TISSUE EXAM BY PATHOLOGIST: CPT | Mod: 26

## 2023-10-25 PROCEDURE — S2900: CPT

## 2023-10-25 PROCEDURE — S2900 ROBOTIC SURGICAL SYSTEM: CPT | Mod: NC

## 2023-10-25 PROCEDURE — 58545 LAPAROSCOPIC MYOMECTOMY: CPT

## 2023-10-25 PROCEDURE — 81025 URINE PREGNANCY TEST: CPT

## 2023-10-25 PROCEDURE — 58545 LAPAROSCOPIC MYOMECTOMY: CPT | Mod: AS

## 2023-10-25 PROCEDURE — C1765: CPT

## 2023-10-25 PROCEDURE — 88305 TISSUE EXAM BY PATHOLOGIST: CPT

## 2023-10-25 RX ORDER — GABAPENTIN 400 MG/1
600 CAPSULE ORAL ONCE
Refills: 0 | Status: COMPLETED | OUTPATIENT
Start: 2023-10-25 | End: 2023-10-25

## 2023-10-25 RX ORDER — CELECOXIB 200 MG/1
400 CAPSULE ORAL ONCE
Refills: 0 | Status: COMPLETED | OUTPATIENT
Start: 2023-10-25 | End: 2023-10-25

## 2023-10-25 RX ORDER — ONDANSETRON 8 MG/1
4 TABLET, FILM COATED ORAL EVERY 6 HOURS
Refills: 0 | Status: DISCONTINUED | OUTPATIENT
Start: 2023-10-25 | End: 2023-10-25

## 2023-10-25 RX ORDER — LEVOTHYROXINE SODIUM 125 MCG
1 TABLET ORAL
Qty: 0 | Refills: 0 | DISCHARGE

## 2023-10-25 RX ORDER — IBUPROFEN 200 MG
1 TABLET ORAL
Qty: 0 | Refills: 0 | DISCHARGE

## 2023-10-25 RX ORDER — FENTANYL CITRATE 50 UG/ML
50 INJECTION INTRAVENOUS
Refills: 0 | Status: DISCONTINUED | OUTPATIENT
Start: 2023-10-25 | End: 2023-10-25

## 2023-10-25 RX ORDER — ACETAMINOPHEN 500 MG
2 TABLET ORAL
Qty: 0 | Refills: 0 | DISCHARGE

## 2023-10-25 RX ORDER — CEFAZOLIN SODIUM 1 G
2000 VIAL (EA) INJECTION ONCE
Refills: 0 | Status: DISCONTINUED | OUTPATIENT
Start: 2023-10-25 | End: 2023-10-25

## 2023-10-25 RX ORDER — NORETHINDRONE AND ETHINYL ESTRADIOL 0.4-0.035
1 KIT ORAL
Refills: 0 | DISCHARGE

## 2023-10-25 RX ORDER — OXYCODONE HYDROCHLORIDE 5 MG/1
5 TABLET ORAL ONCE
Refills: 0 | Status: DISCONTINUED | OUTPATIENT
Start: 2023-10-25 | End: 2023-10-25

## 2023-10-25 RX ORDER — ACETAMINOPHEN 500 MG
1000 TABLET ORAL ONCE
Refills: 0 | Status: COMPLETED | OUTPATIENT
Start: 2023-10-25 | End: 2023-10-25

## 2023-10-25 RX ORDER — ADALIMUMAB 40MG/0.8ML
40 KIT SUBCUTANEOUS
Refills: 0 | DISCHARGE

## 2023-10-25 RX ORDER — OXYCODONE HYDROCHLORIDE 5 MG/1
1 TABLET ORAL
Qty: 10 | Refills: 0
Start: 2023-10-25

## 2023-10-25 RX ORDER — SODIUM CHLORIDE 9 MG/ML
1000 INJECTION, SOLUTION INTRAVENOUS
Refills: 0 | Status: DISCONTINUED | OUTPATIENT
Start: 2023-10-25 | End: 2023-10-25

## 2023-10-25 RX ORDER — HYDROMORPHONE HYDROCHLORIDE 2 MG/ML
0.5 INJECTION INTRAMUSCULAR; INTRAVENOUS; SUBCUTANEOUS
Refills: 0 | Status: DISCONTINUED | OUTPATIENT
Start: 2023-10-25 | End: 2023-10-25

## 2023-10-25 RX ORDER — HEPARIN SODIUM 5000 [USP'U]/ML
5000 INJECTION INTRAVENOUS; SUBCUTANEOUS ONCE
Refills: 0 | Status: COMPLETED | OUTPATIENT
Start: 2023-10-25 | End: 2023-10-25

## 2023-10-25 RX ADMIN — HEPARIN SODIUM 5000 UNIT(S): 5000 INJECTION INTRAVENOUS; SUBCUTANEOUS at 12:02

## 2023-10-25 RX ADMIN — CELECOXIB 400 MILLIGRAM(S): 200 CAPSULE ORAL at 12:02

## 2023-10-25 RX ADMIN — CELECOXIB 400 MILLIGRAM(S): 200 CAPSULE ORAL at 12:13

## 2023-10-25 RX ADMIN — GABAPENTIN 600 MILLIGRAM(S): 400 CAPSULE ORAL at 12:02

## 2023-10-25 RX ADMIN — ONDANSETRON 4 MILLIGRAM(S): 8 TABLET, FILM COATED ORAL at 18:53

## 2023-10-25 RX ADMIN — HYDROMORPHONE HYDROCHLORIDE 0.5 MILLIGRAM(S): 2 INJECTION INTRAMUSCULAR; INTRAVENOUS; SUBCUTANEOUS at 15:45

## 2023-10-25 RX ADMIN — Medication 1000 MILLIGRAM(S): at 12:13

## 2023-10-25 RX ADMIN — OXYCODONE HYDROCHLORIDE 5 MILLIGRAM(S): 5 TABLET ORAL at 15:45

## 2023-10-25 RX ADMIN — Medication 1000 MILLIGRAM(S): at 12:03

## 2023-10-25 NOTE — ASU DISCHARGE PLAN (ADULT/PEDIATRIC) - NS MD DC FALL RISK RISK
For information on Fall & Injury Prevention, visit: [email or web address] OR  [email or web address] OR  https://www.cdc.gov/steadi/patient.html

## 2023-10-25 NOTE — ASU DISCHARGE PLAN (ADULT/PEDIATRIC) - ASU DC SPECIAL INSTRUCTIONSFT
Regular diet. Resume normal activity as tolerated. No heavy lifting or strenuous activity for 4 weeks. Call your doctor with any signs and symptoms of infection such as fever, chills, nausea or vomiting. Call your doctor with redness or swelling at the incision site, fluid leakage or wound separation. Call your doctor if you're unable to tolerate food or have difficulty urinating. Call your doctor if you have pain that is not relieved by your prescribed medications. Notify your doctor with any other concerns. Follow up with Dr. Hutchison as scheduled. You do not to remove your dressing, it will fall off on its own or be removed in the office. It can get wet. You may have irregular spotting or bleeding for the next 4 weeks.

## 2023-10-25 NOTE — BRIEF OPERATIVE NOTE - NSICDXBRIEFPROCEDURE_GEN_ALL_CORE_FT
PROCEDURES:  Myomectomy, 1 to 4 myomas, robot-assisted, laparoscopic 25-Oct-2023 14:55:41  Sung Hutchison

## 2023-10-25 NOTE — ASU DISCHARGE PLAN (ADULT/PEDIATRIC) - CARE PROVIDER_API CALL
Sung Hutchison  Obstetrics and Gynecology  [address] [place] [address]  Phone: [phone]  Fax: [phone]  Follow Up Time:

## 2023-10-25 NOTE — ASU DISCHARGE PLAN (ADULT/PEDIATRIC) - PATIENT EDUCATION MATERIALS PROVIED

## 2023-10-25 NOTE — ASU PATIENT PROFILE, ADULT - FALL HARM RISK - UNIVERSAL INTERVENTIONS
Bed in lowest position, wheels locked, appropriate side rails in place/Call bell, personal items and telephone in reach/Instruct patient to call for assistance before getting out of bed or chair/Non-slip footwear when patient is out of bed/[place] to call system/Physically safe environment - no spills, clutter or unnecessary equipment/Purposeful Proactive Rounding/Room/bathroom lighting operational, light cord in reach

## 2023-10-25 NOTE — ASU PREOP CHECKLIST - BSA (M2)
Ashley Britt is a 24 year old female presenting for a consult for abdominal pain.  Patient reports that the abdominal pain is intermittent.  Patient reported that there is some nausea associated with the abdominal pain.  Patient stated started noticing pain in August 2020.  Denies known Latex allergy or symptoms of Latex sensitivity.  Medications reviewed and updated.       1.59

## 2023-10-26 LAB — HLA-B27 QL NAA+PROBE: NORMAL

## 2023-11-09 LAB
SURGICAL PATHOLOGY STUDY: SIGNIFICANT CHANGE UP
SURGICAL PATHOLOGY STUDY: SIGNIFICANT CHANGE UP

## 2023-11-10 ENCOUNTER — APPOINTMENT (OUTPATIENT)
Dept: OBGYN | Facility: CLINIC | Age: 43
End: 2023-11-10
Payer: COMMERCIAL

## 2023-11-10 VITALS
HEIGHT: 64 IN | BODY MASS INDEX: 20.83 KG/M2 | SYSTOLIC BLOOD PRESSURE: 100 MMHG | DIASTOLIC BLOOD PRESSURE: 68 MMHG | WEIGHT: 122 LBS

## 2023-11-10 PROCEDURE — 99024 POSTOP FOLLOW-UP VISIT: CPT

## 2023-11-24 ENCOUNTER — APPOINTMENT (OUTPATIENT)
Dept: RADIOLOGY | Facility: CLINIC | Age: 43
End: 2023-11-24
Payer: COMMERCIAL

## 2023-11-24 PROCEDURE — 73620 X-RAY EXAM OF FOOT: CPT | Mod: 50

## 2023-11-24 PROCEDURE — 73130 X-RAY EXAM OF HAND: CPT | Mod: 50

## 2023-12-18 ENCOUNTER — APPOINTMENT (OUTPATIENT)
Dept: ULTRASOUND IMAGING | Facility: CLINIC | Age: 43
End: 2023-12-18
Payer: COMMERCIAL

## 2023-12-18 ENCOUNTER — RESULT REVIEW (OUTPATIENT)
Age: 43
End: 2023-12-18

## 2023-12-18 PROCEDURE — 76642 ULTRASOUND BREAST LIMITED: CPT | Mod: LT

## 2023-12-28 ENCOUNTER — TRANSCRIPTION ENCOUNTER (OUTPATIENT)
Age: 43
End: 2023-12-28

## 2023-12-28 DIAGNOSIS — D25.9 LEIOMYOMA OF UTERUS, UNSPECIFIED: ICD-10-CM

## 2023-12-29 ENCOUNTER — TRANSCRIPTION ENCOUNTER (OUTPATIENT)
Age: 43
End: 2023-12-29

## 2024-01-04 ENCOUNTER — APPOINTMENT (OUTPATIENT)
Dept: OBGYN | Facility: CLINIC | Age: 44
End: 2024-01-04

## 2024-01-08 ENCOUNTER — APPOINTMENT (OUTPATIENT)
Dept: ENDOCRINOLOGY | Facility: CLINIC | Age: 44
End: 2024-01-08
Payer: COMMERCIAL

## 2024-01-08 VITALS
RESPIRATION RATE: 16 BRPM | OXYGEN SATURATION: 98 % | TEMPERATURE: 98.5 F | DIASTOLIC BLOOD PRESSURE: 76 MMHG | SYSTOLIC BLOOD PRESSURE: 112 MMHG | BODY MASS INDEX: 21.17 KG/M2 | HEART RATE: 84 BPM | HEIGHT: 64 IN | WEIGHT: 124 LBS

## 2024-01-08 DIAGNOSIS — E06.3 OTHER SPECIFIED HYPOTHYROIDISM: ICD-10-CM

## 2024-01-08 DIAGNOSIS — E03.8 OTHER SPECIFIED HYPOTHYROIDISM: ICD-10-CM

## 2024-01-08 PROCEDURE — 99213 OFFICE O/P EST LOW 20 MIN: CPT

## 2024-01-08 RX ORDER — PREDNISONE 10 MG/1
10 TABLET ORAL
Qty: 60 | Refills: 1 | Status: COMPLETED | COMMUNITY
Start: 2023-10-20 | End: 2024-01-08

## 2024-01-08 RX ORDER — OXYCODONE AND ACETAMINOPHEN 5; 325 MG/1; MG/1
5-325 TABLET ORAL EVERY 4 HOURS
Qty: 30 | Refills: 0 | Status: COMPLETED | COMMUNITY
Start: 2023-10-11 | End: 2024-01-08

## 2024-01-08 NOTE — DATA REVIEWED
[FreeTextEntry1] : Labs 10/2023: A1c 5.4% CMP normal Lipid Profile at goal TSH 0.47  Labs 10/2022: TSH 0.55 HCG neg. Vit D 60.6 A1c 5.4%  Labs 11/2021: TSH 1.42 Free T4 1.5  Chol 197 HDL 63  CMP normal   Labs 4/2021: TSH 4.71 Free T4 1.6 Vit D 58.6 Testosterone 23.7 CMP normal 101

## 2024-01-08 NOTE — REVIEW OF SYSTEMS
[Fatigue] : fatigue [Recent Weight Gain (___ Lbs)] : recent weight gain: [unfilled] lbs [Cough] : cough [Myalgia] : myalgia  [All other systems negative] : All other systems negative [Recent Weight Loss (___ Lbs)] : no recent weight loss [Visual Field Defect] : no visual field defect [Dysphagia] : no dysphagia [Neck Pain] : no neck pain [Dysphonia] : no dysphonia [Palpitations] : no palpitations [Shortness Of Breath] : no shortness of breath [Nausea] : no nausea [Vomiting] : no vomiting [Polyuria] : no polyuria [Cold Intolerance] : no cold intolerance [Heat Intolerance] : no heat intolerance [FreeTextEntry7] : +crohns

## 2024-01-08 NOTE — ASSESSMENT
[FreeTextEntry1] : 42 yo F with Hashimoto's thyroiditis clinically euthyroid on exam. Chemically euthyroid on recent TFTs on Synthroid 100 mcg daily. Can repeat with next lab work. Will adjust further as needed. Can follow-up in 12 months if chemically euthyroid.

## 2024-01-09 ENCOUNTER — APPOINTMENT (OUTPATIENT)
Dept: ULTRASOUND IMAGING | Facility: CLINIC | Age: 44
End: 2024-01-09
Payer: COMMERCIAL

## 2024-01-09 ENCOUNTER — TRANSCRIPTION ENCOUNTER (OUTPATIENT)
Age: 44
End: 2024-01-09

## 2024-01-09 PROCEDURE — 76830 TRANSVAGINAL US NON-OB: CPT

## 2024-01-11 ENCOUNTER — RX RENEWAL (OUTPATIENT)
Age: 44
End: 2024-01-11

## 2024-01-11 RX ORDER — LEVOTHYROXINE SODIUM 100 UG/1
100 TABLET ORAL
Qty: 90 | Refills: 3 | Status: ACTIVE | COMMUNITY
Start: 2022-06-06 | End: 1900-01-01

## 2024-02-13 ENCOUNTER — APPOINTMENT (OUTPATIENT)
Dept: OBGYN | Facility: CLINIC | Age: 44
End: 2024-02-13

## 2024-03-05 ENCOUNTER — APPOINTMENT (OUTPATIENT)
Dept: OBGYN | Facility: CLINIC | Age: 44
End: 2024-03-05
Payer: COMMERCIAL

## 2024-03-05 VITALS
WEIGHT: 127 LBS | BODY MASS INDEX: 21.68 KG/M2 | HEIGHT: 64 IN | SYSTOLIC BLOOD PRESSURE: 95 MMHG | DIASTOLIC BLOOD PRESSURE: 65 MMHG

## 2024-03-05 DIAGNOSIS — Z01.419 ENCOUNTER FOR GYNECOLOGICAL EXAMINATION (GENERAL) (ROUTINE) W/OUT ABNORMAL FINDINGS: ICD-10-CM

## 2024-03-05 PROCEDURE — 99396 PREV VISIT EST AGE 40-64: CPT

## 2024-03-05 RX ORDER — NORETHINDRONE ACETATE AND ETHINYL ESTRADIOL AND FERROUS FUMARATE 1.5-30(21)
1.5-3 KIT ORAL
Qty: 84 | Refills: 3 | Status: ACTIVE | COMMUNITY
Start: 2023-01-30 | End: 1900-01-01

## 2024-03-05 NOTE — HISTORY OF PRESENT ILLNESS
[FreeTextEntry1] : The patient is here for a routine gynecological examination with Pap smear.  Her LMP was 2 weeks   She is on the pill without difficulty and would like to continue   She has no recent gynecological or urinary problems

## 2024-03-05 NOTE — PLAN
[FreeTextEntry1] : A healthy lifestyle can contribute to good health.  This involves maintaining good health habits and avoiding unhealthy activity (e.g. smoking, drugs, etc.)  Patient is counselled on a balanced diet, with Vitamin D supplement as needed.  Any activity is exercise and very important. Walking is encourage and should be brisk. as evidence shows that brisk walking is healthier for both body and brain.    Climbing stairs instead of elevators is good weight bearing exercise.  Dental care both at home and at the dentist office is important.  Rest at night of at least 6 hours is indicated.  The department of healthy lifestyle is available to help in creating good habits as well as dealing with problems  Patient is counselled on breast cancer detection. Regular mammogram as recommended by ACOG is important to detect early breast cancer and allow successful treatment. Mammography should be started at age 40.   Sonogram of the breast is sometimes needed as well. Mammogram with or without sonogram should be done every one or two years, depending upon the different recommendations.  I prefer yearly testing.  Slip is given  The patient is doing well with the pill and can continue   She is cautioned about more serious side effects.  She has no evidence of phlebitis or increasing headaches.  She can continue to the for al long as she wants unless there are problems with it.   She knows to take the pill the same time every day.  If there are questions or problems, she is advised to get in touch with me right away.

## 2024-03-06 LAB — HPV HIGH+LOW RISK DNA PNL CVX: NOT DETECTED

## 2024-03-08 LAB — CYTOLOGY CVX/VAG DOC THIN PREP: NORMAL

## 2024-03-11 ENCOUNTER — APPOINTMENT (OUTPATIENT)
Dept: RHEUMATOLOGY | Facility: CLINIC | Age: 44
End: 2024-03-11
Payer: COMMERCIAL

## 2024-03-11 VITALS
HEART RATE: 80 BPM | OXYGEN SATURATION: 100 % | DIASTOLIC BLOOD PRESSURE: 74 MMHG | SYSTOLIC BLOOD PRESSURE: 127 MMHG | BODY MASS INDEX: 21.46 KG/M2 | WEIGHT: 125 LBS

## 2024-03-11 DIAGNOSIS — M79.671 PAIN IN RIGHT FOOT: ICD-10-CM

## 2024-03-11 DIAGNOSIS — M79.641 PAIN IN RIGHT HAND: ICD-10-CM

## 2024-03-11 DIAGNOSIS — M79.672 PAIN IN RIGHT FOOT: ICD-10-CM

## 2024-03-11 DIAGNOSIS — M13.0 POLYARTHRITIS, UNSPECIFIED: ICD-10-CM

## 2024-03-11 DIAGNOSIS — R20.0 ANESTHESIA OF SKIN: ICD-10-CM

## 2024-03-11 DIAGNOSIS — M79.642 PAIN IN RIGHT HAND: ICD-10-CM

## 2024-03-11 DIAGNOSIS — K50.90 CROHN'S DISEASE, UNSPECIFIED, W/OUT COMPLICATIONS: ICD-10-CM

## 2024-03-11 DIAGNOSIS — M07.60 ENTEROPATHIC ARTHROPATHIES, UNSPECIFIED SITE: ICD-10-CM

## 2024-03-11 PROCEDURE — G2211 COMPLEX E/M VISIT ADD ON: CPT

## 2024-03-11 PROCEDURE — 99214 OFFICE O/P EST MOD 30 MIN: CPT

## 2024-03-11 RX ORDER — MELOXICAM 7.5 MG/1
7.5 TABLET ORAL DAILY
Qty: 60 | Refills: 1 | Status: ACTIVE | COMMUNITY
Start: 2024-03-11 | End: 1900-01-01

## 2024-03-11 NOTE — PHYSICAL EXAM
[General Appearance - Alert] : alert [General Appearance - In No Acute Distress] : in no acute distress [Sclera] : the sclera and conjunctiva were normal [Outer Ear] : the ears and nose were normal in appearance [Neck Appearance] : the appearance of the neck was normal [Oropharynx] : the oropharynx was normal [Neck Cervical Mass (___cm)] : no neck mass was observed [Thyroid Diffuse Enlargement] : the thyroid was not enlarged [Jugular Venous Distention Increased] : there was no jugular-venous distention [Thyroid Nodule] : there were no palpable thyroid nodules [Auscultation Breath Sounds / Voice Sounds] : lungs were clear to auscultation bilaterally [Heart Rate And Rhythm] : heart rate was normal and rhythm regular [Heart Sounds] : normal S1 and S2 [Heart Sounds Gallop] : no gallops [Murmurs] : no murmurs [Heart Sounds Pericardial Friction Rub] : no pericardial rub [Edema] : there was no peripheral edema [Bowel Sounds] : normal bowel sounds [Abdomen Soft] : soft [Abdomen Tenderness] : non-tender [Abdomen Mass (___ Cm)] : no abdominal mass palpated [Cervical Lymph Nodes Enlarged Posterior Bilaterally] : posterior cervical [Cervical Lymph Nodes Enlarged Anterior Bilaterally] : anterior cervical [Supraclavicular Lymph Nodes Enlarged Bilaterally] : supraclavicular [Skin Color & Pigmentation] : normal skin color and pigmentation [Skin Turgor] : normal skin turgor [No Focal Deficits] : no focal deficits [] : no rash [Oriented To Time, Place, And Person] : oriented to person, place, and time [Impaired Insight] : insight and judgment were intact [Affect] : the affect was normal [FreeTextEntry1] : No synovitis;  (+)tenderness in several B/L PIP's

## 2024-03-11 NOTE — ASSESSMENT
[FreeTextEntry1] : 43 year old female with hx of Crohn's Disease c/o severe persistent pain in her hands and feet.  The etiology of her symptoms is unclear.  While she reports that her symptoms occur primarily at rest, which is suggestive of an inflammatory arthritis (e.g. IBD-related arthritis), she denies any joint swelling or AM stiffness.  As she also c/o intermittent numbness/tingling in her hands and feet, I suspect that some of her symptoms are likely due to a neurologic etiology.     - Rx Medrol pack.  Pt to then call to report if any improvement.   - Will refer for US of hands/feet - r/o underlying synovitis.   - Recommended exercises   - Rx meloxicam prn for now.   - Cont Humira, as per GI.

## 2024-03-14 ENCOUNTER — NON-APPOINTMENT (OUTPATIENT)
Age: 44
End: 2024-03-14

## 2024-04-20 ENCOUNTER — APPOINTMENT (OUTPATIENT)
Dept: DERMATOLOGY | Facility: CLINIC | Age: 44
End: 2024-04-20
Payer: COMMERCIAL

## 2024-04-20 DIAGNOSIS — L70.0 ACNE VULGARIS: ICD-10-CM

## 2024-04-20 PROCEDURE — 99214 OFFICE O/P EST MOD 30 MIN: CPT | Mod: 95

## 2024-04-20 RX ORDER — CLASCOTERONE 1 G/100G
1 CREAM TOPICAL
Qty: 1 | Refills: 3 | Status: ACTIVE | COMMUNITY
Start: 2024-04-20 | End: 1900-01-01

## 2024-04-28 PROBLEM — L70.0 ACNE VULGARIS: Status: ACTIVE | Noted: 2023-04-18

## 2024-04-28 NOTE — ASSESSMENT
[FreeTextEntry1] : 1) AV: -chronic, not at tx goal, flaring -Recommended going up to [facility] since she has failed on tretinoin. -Provided dry skin care education.

## 2024-04-28 NOTE — HISTORY OF PRESENT ILLNESS
[FreeTextEntry1] : AV [de-identified] : This visit was conducted via telehealth via live 2 way audio video connection between myself and patient. She has acne and scarring and has been using tretinoin which helps but  believes it has stablized. She would like to go up in strength. She is not trying to have kids.

## 2024-04-28 NOTE — PHYSICAL EXAM
[Alert] : alert [Oriented x 3] : ~L oriented x 3 [FreeTextEntry3] : inflammatory papules on the cheeks

## 2024-04-29 RX ORDER — TAZAROTENE 0.45 MG/G
0.04 LOTION TOPICAL
Qty: 1 | Refills: 4 | Status: ACTIVE | COMMUNITY
Start: 2024-04-20

## 2024-04-30 NOTE — DISCUSSION/SUMMARY
Chance Olmos is a 76 year old male presenting for   Chief Complaint   Patient presents with    Office Visit    Hip Pain     Past few years- Left hip/ leg pain- pain worsening     Denies Latex allergy or sensitivity.    Medication verified and med list updated  Refills needed today: No       Health Maintenance       Shingles Vaccine (1 of 2)  Never done    COVID-19 Vaccine (4 - 2023-24 season)  Overdue since 9/1/2023           Following review of the above:  Patient is not proceeding with:      Note: Refer to final orders and clinician documentation.                Last lab results:   No results found for: \"HGBA1C\"  Cholesterol (mg/dL)   Date Value   08/24/2020 160     HDL (mg/dL)   Date Value   08/24/2020 60     Triglycerides (mg/dL)   Date Value   08/24/2020 53     LDL (mg/dL)   Date Value   08/24/2020 89     No results found for: \"URMIC\", \"UCR\", \"MALBCR\"  Lab Results   Component Value Date/Time    IFOB POSITIVE (A) 06/25/2019 07:49 AM                    Depression Screening:  Review Flowsheet  More data exists         4/30/2024   PHQ 2/9 Score   Adult PHQ 2 Score 0   Adult PHQ 2 Interpretation No further screening needed   Little interest or pleasure in activity? Not at all   Feeling down, depressed or hopeless? Not at all        Advance Directives:  discussed and advised the patient to complete one   
PROGRESS NOTE     Subjective     Chance is a 76 
[FreeTextEntry1] : Bridgett is a patient with initiation and maintenance insomnia, likely secondary to history of anxiety and depression, rule out obstructive sleep apnea (less likely), secondly, she is a patient with history of cigarette smoking

## 2024-06-04 ENCOUNTER — APPOINTMENT (OUTPATIENT)
Dept: OBGYN | Facility: CLINIC | Age: 44
End: 2024-06-04
Payer: COMMERCIAL

## 2024-06-04 VITALS — BODY MASS INDEX: 21 KG/M2 | WEIGHT: 123 LBS | HEIGHT: 64 IN

## 2024-06-04 DIAGNOSIS — R19.09 OTHER INTRA-ABDOMINAL AND PELVIC SWELLING, MASS AND LUMP: ICD-10-CM

## 2024-06-04 PROCEDURE — 99213 OFFICE O/P EST LOW 20 MIN: CPT

## 2024-06-04 RX ORDER — METHYLPREDNISOLONE 4 MG/1
4 TABLET ORAL
Qty: 1 | Refills: 0 | Status: DISCONTINUED | COMMUNITY
Start: 2024-03-11 | End: 2024-06-04

## 2024-06-04 NOTE — PHYSICAL EXAM
[Labia Majora] : normal [Labia Minora] : normal [Normal] : normal [Uterine Adnexae] : normal [FreeTextEntry1] : two lumps in the left groin area.  One tender and about3 cm in the mid portion.  The other is 2 cm in the upper portion and non tender

## 2024-06-04 NOTE — HISTORY OF PRESENT ILLNESS
[FreeTextEntry1] : groin lump  Patient had trauma to the left groin five days ago  (something fell on it) It has gotten smaller since she first felt it but it is still painful

## 2024-06-04 NOTE — DISCUSSION/SUMMARY
[FreeTextEntry1] : one lump is definitely a lymph node had has been there for years.   The other may be related to the trauma but also may be another lymph node   Since it is shrinking it makes sense to wait a week to see if it goes away.  If not or if it is larger, then sonogram is indicated

## 2024-06-17 ENCOUNTER — RESULT REVIEW (OUTPATIENT)
Age: 44
End: 2024-06-17

## 2024-06-17 ENCOUNTER — APPOINTMENT (OUTPATIENT)
Dept: MAMMOGRAPHY | Facility: CLINIC | Age: 44
End: 2024-06-17

## 2024-06-17 ENCOUNTER — APPOINTMENT (OUTPATIENT)
Dept: ULTRASOUND IMAGING | Facility: CLINIC | Age: 44
End: 2024-06-17

## 2024-06-17 ENCOUNTER — APPOINTMENT (OUTPATIENT)
Dept: ULTRASOUND IMAGING | Facility: CLINIC | Age: 44
End: 2024-06-17
Payer: COMMERCIAL

## 2024-06-17 PROCEDURE — 76641 ULTRASOUND BREAST COMPLETE: CPT | Mod: 50

## 2024-06-17 PROCEDURE — 77067 SCR MAMMO BI INCL CAD: CPT

## 2024-06-17 PROCEDURE — 77063 BREAST TOMOSYNTHESIS BI: CPT

## 2024-07-23 ENCOUNTER — APPOINTMENT (OUTPATIENT)
Dept: RHEUMATOLOGY | Facility: CLINIC | Age: 44
End: 2024-07-23

## 2024-07-23 VITALS
HEIGHT: 64 IN | HEART RATE: 68 BPM | WEIGHT: 123 LBS | DIASTOLIC BLOOD PRESSURE: 70 MMHG | BODY MASS INDEX: 21 KG/M2 | OXYGEN SATURATION: 100 % | SYSTOLIC BLOOD PRESSURE: 108 MMHG

## 2024-07-23 PROCEDURE — 76882 US LMTD JT/FCL EVL NVASC XTR: CPT

## 2024-07-30 ENCOUNTER — RX RENEWAL (OUTPATIENT)
Age: 44
End: 2024-07-30

## 2024-07-30 PROBLEM — E06.3 HYPOTHYROIDISM DUE TO HASHIMOTO'S THYROIDITIS: Status: ACTIVE | Noted: 2021-11-02

## 2024-08-19 ENCOUNTER — APPOINTMENT (OUTPATIENT)
Dept: RHEUMATOLOGY | Facility: CLINIC | Age: 44
End: 2024-08-19
Payer: COMMERCIAL

## 2024-08-19 VITALS
SYSTOLIC BLOOD PRESSURE: 113 MMHG | BODY MASS INDEX: 20.83 KG/M2 | WEIGHT: 122 LBS | DIASTOLIC BLOOD PRESSURE: 74 MMHG | OXYGEN SATURATION: 98 % | HEIGHT: 64 IN | HEART RATE: 79 BPM

## 2024-08-19 DIAGNOSIS — M79.671 PAIN IN RIGHT FOOT: ICD-10-CM

## 2024-08-19 DIAGNOSIS — K50.90 CROHN'S DISEASE, UNSPECIFIED, W/OUT COMPLICATIONS: ICD-10-CM

## 2024-08-19 DIAGNOSIS — M25.532 PAIN IN RIGHT WRIST: ICD-10-CM

## 2024-08-19 DIAGNOSIS — M25.531 PAIN IN RIGHT WRIST: ICD-10-CM

## 2024-08-19 DIAGNOSIS — M79.672 PAIN IN RIGHT FOOT: ICD-10-CM

## 2024-08-19 DIAGNOSIS — R20.0 ANESTHESIA OF SKIN: ICD-10-CM

## 2024-08-19 DIAGNOSIS — M79.641 PAIN IN RIGHT HAND: ICD-10-CM

## 2024-08-19 DIAGNOSIS — M79.642 PAIN IN RIGHT HAND: ICD-10-CM

## 2024-08-19 PROCEDURE — 99214 OFFICE O/P EST MOD 30 MIN: CPT

## 2024-08-19 PROCEDURE — G2211 COMPLEX E/M VISIT ADD ON: CPT

## 2024-08-19 NOTE — DATA REVIEWED
[FreeTextEntry1] : US hands/ feet: Right hand/wrist - hand: no effusions, erosions, synovial proliferation, doppler negative - wrist: no effusions, erosions, synovial proliferation, doppler negative  Left hand/wrist - hand: no effusions, erosions, synovial proliferation, doppler negative - wrist: no effusions, synovial proliferation, doppler negative. possible cortical irregularity seen on the dorsal surface of the lunate seen in both longitudinal and transverse views (0.17 cm x 0.13 cm).

## 2024-08-19 NOTE — HISTORY OF PRESENT ILLNESS
[Arthralgias] : arthralgias [FreeTextEntry1] : 8/19/2024:  Still feeling "the same".  Continues to experience pain and numbness in her hands and feet, unchanged.  No new complaints. 3/11/2024:  Feeling 'the same" since last visit.  Still w/ pain in her hands and feet, worse at rest, though no joint swelling or AM stiffness.  No improvement on prednisone 10mg x 1 month.  Crohn's remains well controlled. [Anorexia] : no anorexia [Weight Loss] : no weight loss [Malaise] : no malaise [Fever] : no fever [Chills] : no chills [Fatigue] : no fatigue [Malar Facial Rash] : no malar facial rash [Skin Lesions] : no lesions [Skin Nodules] : no skin nodules [Oral Ulcers] : no oral ulcers [Cough] : no cough [Dry Mouth] : no dry mouth [Dysphonia] : no dysphonia [Dysphagia] : no dysphagia [Shortness of Breath] : no shortness of breath [Chest Pain] : no chest pain [Joint Swelling] : no joint swelling [Joint Warmth] : no joint warmth [Joint Deformity] : no joint deformity [Decreased ROM] : no decreased range of motion [Morning Stiffness] : no morning stiffness [Falls] : no falls [Difficulty Standing] : no difficulty standing [Difficulty Walking] : no difficulty walking [Dyspnea] : no dyspnea [Myalgias] : no myalgias [Muscle Weakness] : no muscle weakness [Muscle Spasms] : no muscle spasms [Muscle Cramping] : no muscle cramping [Visual Changes] : no visual changes [Eye Pain] : no eye pain [Eye Redness] : no eye redness [Dry Eyes] : no dry eyes

## 2024-08-19 NOTE — PHYSICAL EXAM
[General Appearance - Alert] : alert [General Appearance - In No Acute Distress] : in no acute distress [Sclera] : the sclera and conjunctiva were normal [Outer Ear] : the ears and nose were normal in appearance [Oropharynx] : the oropharynx was normal [Neck Appearance] : the appearance of the neck was normal [Neck Cervical Mass (___cm)] : no neck mass was observed [Jugular Venous Distention Increased] : there was no jugular-venous distention [Thyroid Diffuse Enlargement] : the thyroid was not enlarged [Thyroid Nodule] : there were no palpable thyroid nodules [Auscultation Breath Sounds / Voice Sounds] : lungs were clear to auscultation bilaterally [Heart Rate And Rhythm] : heart rate was normal and rhythm regular [Heart Sounds] : normal S1 and S2 [Heart Sounds Gallop] : no gallops [Murmurs] : no murmurs [Heart Sounds Pericardial Friction Rub] : no pericardial rub [Edema] : there was no peripheral edema [Bowel Sounds] : normal bowel sounds [Abdomen Soft] : soft [Abdomen Tenderness] : non-tender [Abdomen Mass (___ Cm)] : no abdominal mass palpated [Cervical Lymph Nodes Enlarged Posterior Bilaterally] : posterior cervical [Cervical Lymph Nodes Enlarged Anterior Bilaterally] : anterior cervical [Supraclavicular Lymph Nodes Enlarged Bilaterally] : supraclavicular [Skin Color & Pigmentation] : normal skin color and pigmentation [Skin Turgor] : normal skin turgor [] : no rash [No Focal Deficits] : no focal deficits [Oriented To Time, Place, And Person] : oriented to person, place, and time [Impaired Insight] : insight and judgment were intact [Affect] : the affect was normal [FreeTextEntry1] : No synovitis;  (+)tenderness throughout B/L hands;  normal ROM in all joints

## 2024-08-19 NOTE — ASSESSMENT
[FreeTextEntry1] : 44 year old female with hx of Crohn's Disease c/o severe persistent pain in her hands and feet.  The etiology of her symptoms is unclear.  w/u for inflammatory arthritis, including recent US hands/feet negative.  No response to steroids..  As she also c/o intermittent numbness/tingling in her hands and feet, I suspect that her symptoms are more likely due to a neurologic etiology.  US revealed cortical irregularity on her left wrist.   - Refer to neurology.   - Cont meloxicam prn for now.   - Cont Humira, as per GI.   - MRI left wrist - further evaluation re: cortical irregularity.

## 2024-10-16 ENCOUNTER — APPOINTMENT (OUTPATIENT)
Dept: SURGERY | Facility: CLINIC | Age: 44
End: 2024-10-16
Payer: COMMERCIAL

## 2024-10-16 PROCEDURE — 99204K: CUSTOM

## 2024-10-17 ENCOUNTER — APPOINTMENT (OUTPATIENT)
Dept: GASTROENTEROLOGY | Facility: CLINIC | Age: 44
End: 2024-10-17
Payer: COMMERCIAL

## 2024-10-17 DIAGNOSIS — K50.90 CROHN'S DISEASE, UNSPECIFIED, W/OUT COMPLICATIONS: ICD-10-CM

## 2024-10-17 DIAGNOSIS — R13.10 DYSPHAGIA, UNSPECIFIED: ICD-10-CM

## 2024-10-17 PROCEDURE — 99443: CPT

## 2024-10-23 RX ORDER — TRETINOIN 1 MG/G
0.1 CREAM TOPICAL
Qty: 1 | Refills: 12 | Status: ACTIVE | COMMUNITY
Start: 2024-10-18

## 2024-11-04 ENCOUNTER — TRANSCRIPTION ENCOUNTER (OUTPATIENT)
Age: 44
End: 2024-11-04

## 2024-11-04 DIAGNOSIS — K50.90 CROHN'S DISEASE, UNSPECIFIED, W/OUT COMPLICATIONS: ICD-10-CM

## 2024-11-05 ENCOUNTER — APPOINTMENT (OUTPATIENT)
Dept: ENDOCRINOLOGY | Facility: CLINIC | Age: 44
End: 2024-11-05
Payer: COMMERCIAL

## 2024-11-05 VITALS
WEIGHT: 123.5 LBS | RESPIRATION RATE: 18 BRPM | HEIGHT: 64 IN | HEART RATE: 85 BPM | SYSTOLIC BLOOD PRESSURE: 120 MMHG | BODY MASS INDEX: 21.08 KG/M2 | OXYGEN SATURATION: 99 % | DIASTOLIC BLOOD PRESSURE: 77 MMHG | TEMPERATURE: 98.1 F

## 2024-11-05 DIAGNOSIS — E06.3 AUTOIMMUNE THYROIDITIS: ICD-10-CM

## 2024-11-05 PROCEDURE — 99214 OFFICE O/P EST MOD 30 MIN: CPT

## 2024-11-06 LAB
BASOPHILS # BLD AUTO: 0.06 K/UL
BASOPHILS NFR BLD AUTO: 0.8 %
CHOLEST SERPL-MCNC: 209 MG/DL
EOSINOPHIL # BLD AUTO: 0.23 K/UL
EOSINOPHIL NFR BLD AUTO: 3.1 %
ESTIMATED AVERAGE GLUCOSE: 103 MG/DL
HBA1C MFR BLD HPLC: 5.2 %
HCT VFR BLD CALC: 36.1 %
HDLC SERPL-MCNC: 61 MG/DL
HGB BLD-MCNC: 11 G/DL
IMM GRANULOCYTES NFR BLD AUTO: 0.4 %
LDLC SERPL CALC-MCNC: 127 MG/DL
LYMPHOCYTES # BLD AUTO: 3.09 K/UL
LYMPHOCYTES NFR BLD AUTO: 41.9 %
MAN DIFF?: NORMAL
MCHC RBC-ENTMCNC: 25.5 PG
MCHC RBC-ENTMCNC: 30.5 G/DL
MCV RBC AUTO: 83.8 FL
MONOCYTES # BLD AUTO: 0.56 K/UL
MONOCYTES NFR BLD AUTO: 7.6 %
NEUTROPHILS # BLD AUTO: 3.4 K/UL
NEUTROPHILS NFR BLD AUTO: 46.2 %
NONHDLC SERPL-MCNC: 148 MG/DL
PLATELET # BLD AUTO: 457 K/UL
RBC # BLD: 4.31 M/UL
RBC # FLD: 15 %
T3 SERPL-MCNC: 88 NG/DL
T4 FREE SERPL-MCNC: 1.6 NG/DL
TRIGL SERPL-MCNC: 122 MG/DL
TSH SERPL-ACNC: 1.01 UIU/ML
WBC # FLD AUTO: 7.37 K/UL

## 2024-11-08 ENCOUNTER — TRANSCRIPTION ENCOUNTER (OUTPATIENT)
Age: 44
End: 2024-11-08

## 2024-12-10 ENCOUNTER — APPOINTMENT (OUTPATIENT)
Dept: MRI IMAGING | Facility: CLINIC | Age: 44
End: 2024-12-10
Payer: COMMERCIAL

## 2024-12-10 ENCOUNTER — APPOINTMENT (OUTPATIENT)
Dept: ULTRASOUND IMAGING | Facility: CLINIC | Age: 44
End: 2024-12-10

## 2024-12-10 PROCEDURE — 77049 MRI BREAST C-+ W/CAD BI: CPT

## 2024-12-10 PROCEDURE — A9585: CPT

## 2024-12-13 ENCOUNTER — RX RENEWAL (OUTPATIENT)
Age: 44
End: 2024-12-13

## 2025-02-05 ENCOUNTER — APPOINTMENT (OUTPATIENT)
Dept: INTERNAL MEDICINE | Facility: CLINIC | Age: 45
End: 2025-02-05
Payer: COMMERCIAL

## 2025-02-05 DIAGNOSIS — R05.9 COUGH, UNSPECIFIED: ICD-10-CM

## 2025-02-05 DIAGNOSIS — R09.81 NASAL CONGESTION: ICD-10-CM

## 2025-02-05 PROCEDURE — 99203 OFFICE O/P NEW LOW 30 MIN: CPT | Mod: 95

## 2025-02-05 RX ORDER — BENZONATATE 100 MG/1
100 CAPSULE ORAL
Qty: 12 | Refills: 0 | Status: ACTIVE | COMMUNITY
Start: 2025-02-05 | End: 1900-01-01

## 2025-02-05 RX ORDER — FLUTICASONE PROPIONATE 50 UG/1
50 SPRAY, METERED NASAL TWICE DAILY
Qty: 1 | Refills: 0 | Status: ACTIVE | COMMUNITY
Start: 2025-02-05 | End: 1900-01-01

## 2025-02-18 ENCOUNTER — APPOINTMENT (OUTPATIENT)
Dept: DERMATOLOGY | Facility: CLINIC | Age: 45
End: 2025-02-18
Payer: COMMERCIAL

## 2025-02-18 ENCOUNTER — RX RENEWAL (OUTPATIENT)
Age: 45
End: 2025-02-18

## 2025-02-18 DIAGNOSIS — L30.9 DERMATITIS, UNSPECIFIED: ICD-10-CM

## 2025-02-18 DIAGNOSIS — L70.0 ACNE VULGARIS: ICD-10-CM

## 2025-02-18 PROCEDURE — 99214 OFFICE O/P EST MOD 30 MIN: CPT

## 2025-02-18 RX ORDER — TRIAMCINOLONE ACETONIDE 1 MG/G
0.1 CREAM TOPICAL TWICE DAILY
Qty: 1 | Refills: 1 | Status: ACTIVE | COMMUNITY
Start: 2025-02-18 | End: 1900-01-01

## 2025-03-03 ENCOUNTER — APPOINTMENT (OUTPATIENT)
Dept: INTERNAL MEDICINE | Facility: CLINIC | Age: 45
End: 2025-03-03

## 2025-03-03 ENCOUNTER — NON-APPOINTMENT (OUTPATIENT)
Age: 45
End: 2025-03-03

## 2025-03-03 DIAGNOSIS — J01.90 ACUTE SINUSITIS, UNSPECIFIED: ICD-10-CM

## 2025-03-03 PROCEDURE — G2211 COMPLEX E/M VISIT ADD ON: CPT | Mod: 95

## 2025-03-03 PROCEDURE — 99213 OFFICE O/P EST LOW 20 MIN: CPT | Mod: 95

## 2025-03-03 RX ORDER — AMOXICILLIN AND CLAVULANATE POTASSIUM 875; 125 MG/1; MG/1
875-125 TABLET, COATED ORAL
Qty: 14 | Refills: 0 | Status: ACTIVE | COMMUNITY
Start: 2025-03-03 | End: 1900-01-01

## 2025-03-18 ENCOUNTER — APPOINTMENT (OUTPATIENT)
Dept: OBGYN | Facility: CLINIC | Age: 45
End: 2025-03-18
Payer: COMMERCIAL

## 2025-03-18 VITALS
DIASTOLIC BLOOD PRESSURE: 71 MMHG | HEIGHT: 64 IN | SYSTOLIC BLOOD PRESSURE: 107 MMHG | BODY MASS INDEX: 21.17 KG/M2 | WEIGHT: 124 LBS

## 2025-03-18 DIAGNOSIS — Z01.419 ENCOUNTER FOR GYNECOLOGICAL EXAMINATION (GENERAL) (ROUTINE) W/OUT ABNORMAL FINDINGS: ICD-10-CM

## 2025-03-18 PROCEDURE — 99459 PELVIC EXAMINATION: CPT

## 2025-03-18 PROCEDURE — 99396 PREV VISIT EST AGE 40-64: CPT

## 2025-03-18 RX ORDER — MISOPROSTOL 200 UG/1
200 TABLET ORAL
Qty: 2 | Refills: 0 | Status: ACTIVE | COMMUNITY
Start: 2025-03-18 | End: 1900-01-01

## 2025-03-19 LAB — HPV HIGH+LOW RISK DNA PNL CVX: NOT DETECTED

## 2025-03-22 LAB — CYTOLOGY CVX/VAG DOC THIN PREP: NORMAL

## 2025-03-26 ENCOUNTER — APPOINTMENT (OUTPATIENT)
Dept: OBGYN | Facility: CLINIC | Age: 45
End: 2025-03-26
Payer: COMMERCIAL

## 2025-03-26 DIAGNOSIS — Z30.430 ENCOUNTER FOR INSERTION OF INTRAUTERINE CONTRACEPTIVE DEVICE: ICD-10-CM

## 2025-03-26 PROCEDURE — 58300 INSERT INTRAUTERINE DEVICE: CPT

## 2025-03-26 PROCEDURE — 81025 URINE PREGNANCY TEST: CPT

## 2025-03-27 LAB
HCG UR QL: NEGATIVE
QUALITY CONTROL: YES

## 2025-04-07 NOTE — ASSESSMENT
Re: Request for Information  Dear  and office staff, we are requesting information from your office on patient Cindy Mars, MRN: [id number], : 1945.     Date of surgery: 2025  Surgeon(s):  Ashish Bassett MD  Procedure(s):  RIGHT TOTAL KNEE ARTHROPLASTY    Please fax the following items as noted below:    [x] Labs within 90 days  PT/INR, CBC W/DIFF, BMP, TYPE/SCREEN PRBC  [x] EKG within 6 months  [x] Medical Clearance within 30 days   [] Cardiac Clearance within 30 days   [x] History and Physical within 30 days   [] Chest X-ray  [] Other     Please fax back as soon as possible to [phone]. If you have any questions, please contact the [facility] at [phone] as soon as possible to avoid any delay in service for your patient.    All documentation (ie. History and Physical, labs) MUST contain name and date of birth on EACH page.    Thank you for helping us provide your patient with the best possible experience!   [FreeTextEntry1] : 43 F CD with TI stricture, hypothyroidism, anorexia as teen, here in follow-up. Other medical problems include acne, anxiety with depression, ASCUS with positive high-risk HPV, chronic sinus complaints, dermatofibroma, eustachian tube dysfunction, deviated nasal septum, fibroid uterus, Hashimoto's thyroiditis, high cholesterol, migraines. There is a family history of gastric cancer.\par She had a small bowel obstruction which required TI resection by Procaccino in 2020 and is currently doing well on Humira apart from the fact that she is at times pellet sized bowel movements (this is in spite of copious water and fiber) which are very hard to expel. \par \par The patient is medically optimized for procedure(s). All questions have been answered. The risks and benefits of the procedure have been discussed and the patient signed consent for the procedure. Preliminary results will be discussed when the patient wakes up from anesthesia, but definitive results will be discussed after the pathology report is issued in 5 business days.\par

## 2025-04-09 ENCOUNTER — APPOINTMENT (OUTPATIENT)
Dept: NEUROLOGY | Facility: CLINIC | Age: 45
End: 2025-04-09

## 2025-04-16 ENCOUNTER — APPOINTMENT (OUTPATIENT)
Dept: OBGYN | Facility: CLINIC | Age: 45
End: 2025-04-16
Payer: COMMERCIAL

## 2025-04-16 ENCOUNTER — ASOB RESULT (OUTPATIENT)
Age: 45
End: 2025-04-16

## 2025-04-16 PROCEDURE — 76830 TRANSVAGINAL US NON-OB: CPT

## 2025-05-14 ENCOUNTER — RX RENEWAL (OUTPATIENT)
Age: 45
End: 2025-05-14

## 2025-05-29 NOTE — PHYSICAL EXAM
Start dayquil. can resume normal activities once you are fever free for at least 24 hours without fever reducing medications and overall have had symptom improvement. Should wear a mask for up to 10 days from symptom onset.    [Alert] : alert [Well Nourished] : well nourished [No Acute Distress] : no acute distress [Well Developed] : well developed [No Proptosis] : no proptosis [Thyroid Not Enlarged] : the thyroid was not enlarged [No Thyroid Nodules] : no palpable thyroid nodules [No Respiratory Distress] : no respiratory distress [No Accessory Muscle Use] : no accessory muscle use [Normal Rate and Effort] : normal respiratory rate and effort [Clear to Auscultation] : lungs were clear to auscultation bilaterally [Normal S1, S2] : normal S1 and S2 [Normal Rate] : heart rate was normal [Regular Rhythm] : with a regular rhythm [No Edema] : no peripheral edema [Normal Bowel Sounds] : normal bowel sounds [Not Tender] : non-tender [Not Distended] : not distended [Soft] : abdomen soft [Oriented x3] : oriented to person, place, and time [Normal Affect] : the affect was normal [Normal Mood] : the mood was normal [No Stigmata of Cushings Syndrome] : no stigmata of Cushings Syndrome

## 2025-06-04 RX ORDER — ADALIMUMAB-RYVK 40MG/0.4ML
40 KIT SUBCUTANEOUS
Qty: 1 | Refills: 12 | Status: ACTIVE | COMMUNITY
Start: 2025-06-04 | End: 1900-01-01

## 2025-06-12 ENCOUNTER — NON-APPOINTMENT (OUTPATIENT)
Age: 45
End: 2025-06-12

## 2025-06-18 ENCOUNTER — RESULT REVIEW (OUTPATIENT)
Age: 45
End: 2025-06-18

## 2025-06-18 ENCOUNTER — APPOINTMENT (OUTPATIENT)
Dept: MAMMOGRAPHY | Facility: CLINIC | Age: 45
End: 2025-06-18
Payer: COMMERCIAL

## 2025-06-18 ENCOUNTER — APPOINTMENT (OUTPATIENT)
Dept: ULTRASOUND IMAGING | Facility: CLINIC | Age: 45
End: 2025-06-18
Payer: COMMERCIAL

## 2025-06-18 PROCEDURE — 76641 ULTRASOUND BREAST COMPLETE: CPT | Mod: 50

## 2025-06-18 PROCEDURE — 77063 BREAST TOMOSYNTHESIS BI: CPT

## 2025-06-18 PROCEDURE — 77067 SCR MAMMO BI INCL CAD: CPT

## 2025-06-19 PROBLEM — N64.89 ASYMMETRICAL BREASTS: Status: ACTIVE | Noted: 2025-06-19

## 2025-06-24 ENCOUNTER — APPOINTMENT (OUTPATIENT)
Dept: MAMMOGRAPHY | Facility: CLINIC | Age: 45
End: 2025-06-24
Payer: COMMERCIAL

## 2025-06-24 ENCOUNTER — RESULT REVIEW (OUTPATIENT)
Age: 45
End: 2025-06-24

## 2025-06-24 ENCOUNTER — APPOINTMENT (OUTPATIENT)
Dept: ULTRASOUND IMAGING | Facility: CLINIC | Age: 45
End: 2025-06-24
Payer: COMMERCIAL

## 2025-06-24 PROCEDURE — G0279: CPT | Mod: RT

## 2025-06-24 PROCEDURE — 76642 ULTRASOUND BREAST LIMITED: CPT | Mod: RT

## 2025-06-24 PROCEDURE — 77065 DX MAMMO INCL CAD UNI: CPT | Mod: RT

## 2025-07-10 PROBLEM — N63.10 BREAST MASS, RIGHT: Status: ACTIVE | Noted: 2025-07-10

## 2025-09-19 ENCOUNTER — APPOINTMENT (OUTPATIENT)
Dept: DERMATOLOGY | Facility: CLINIC | Age: 45
End: 2025-09-19
Payer: COMMERCIAL

## 2025-09-19 DIAGNOSIS — L21.9 SEBORRHEIC DERMATITIS, UNSPECIFIED: ICD-10-CM

## 2025-09-19 DIAGNOSIS — L30.9 DERMATITIS, UNSPECIFIED: ICD-10-CM

## 2025-09-19 PROCEDURE — 99213 OFFICE O/P EST LOW 20 MIN: CPT

## 2025-09-19 RX ORDER — BETAMETHASONE DIPROPIONATE 0.5 MG/ML
0.05 LOTION, AUGMENTED TOPICAL TWICE DAILY
Qty: 1 | Refills: 5 | Status: ACTIVE | COMMUNITY
Start: 2025-09-19 | End: 1900-01-01

## (undated) DEVICE — SYR LUER LOK 5CC

## (undated) DEVICE — TUBING SUCTION CONN 6FT STERILE

## (undated) DEVICE — STERIS DEFENDO 3-PIECE KIT (AIR/WATER, SUCTION & BIOPSY VALVES)

## (undated) DEVICE — FOLEY HOLDER STATLOCK 2 WAY ADULT

## (undated) DEVICE — FORCEP ENDOJAW AGTR LC W NDL 2.8MM 230CM DISP

## (undated) DEVICE — SYR LUER LOK 20CC

## (undated) DEVICE — CATH BLLN ANORECTAL 400CC

## (undated) DEVICE — SUCTION YANKAUER NO CONTROL VENT

## (undated) DEVICE — SYR LUER LOK 50CC